# Patient Record
Sex: FEMALE | Race: WHITE | URBAN - METROPOLITAN AREA
[De-identification: names, ages, dates, MRNs, and addresses within clinical notes are randomized per-mention and may not be internally consistent; named-entity substitution may affect disease eponyms.]

---

## 2017-01-31 RX ORDER — GLIMEPIRIDE 2 MG/1
2 TABLET ORAL 2 TIMES DAILY
COMMUNITY

## 2017-02-02 NOTE — PHARMACY-ADMISSION MEDICATION HISTORY
Medication reconciliation completed by pre-admitting(Serina Ness).    Prior to Admission medications    Medication Sig Last Dose Taking? Auth Provider   TIZANIDINE HCL PO Take 4 mg by mouth every morning  Yes Unknown, Entered By History   TIZANIDINE HCL PO Take 8 mg by mouth At Bedtime  Yes Unknown, Entered By History   OMEPRAZOLE PO Take 20 mg by mouth 2 times daily (before meals)  Yes Unknown, Entered By History   FLUoxetine HCl (PROZAC PO) Take 20 mg by mouth daily  Yes Reported, Patient   glimepiride (AMARYL) 2 MG tablet Take 2 mg by mouth 2 times daily  Yes Reported, Patient   METFORMIN HCL PO Take 1,000 mg by mouth 2 times daily (with meals)  Yes Reported, Patient   GABAPENTIN PO Take 1,200 mg by mouth 2 times daily  Yes Reported, Patient   OXAZEPAM PO Take 10 mg by mouth 2 times daily  Yes Reported, Patient   HYDROcodone-acetaminophen (NORCO) 5-325 MG per tablet Take 1-2 tablets by mouth every 6 hours as needed for moderate to severe pain  Yes Roxie Qureshi MD   cyclobenzaprine (FLEXERIL) 10 MG tablet Take 1 tablet (10 mg) by mouth nightly as needed for muscle spasms  Yes Roxie Qureshi MD   HYDROXYZINE HCL PO Take 25 mg by mouth 3 times daily as needed for other (anxiety)  Yes Reported, Patient   LORAZEPAM PO Take 0.5 mg by mouth nightly as needed for anxiety   Yes Reported, Patient   NORTRIPTYLINE HCL PO Take 50 mg by mouth At Bedtime   Yes Reported, Patient   SIMVASTATIN PO Take 20 mg by mouth daily  Yes Reported, Patient   albuterol (PROAIR HFA, PROVENTIL HFA, VENTOLIN HFA) 108 (90 BASE) MCG/ACT inhaler Inhale 2 puffs into the lungs every 6 hours as needed   Yes Reported, Patient   METOPROLOL SUCCINATE ER PO Take 25 mg by mouth daily   Reported, Patient

## 2017-02-07 NOTE — PLAN OF CARE
Called Dr. Qureshi office and left voice message with staff regarding pt's blood glucose of 312 and hgb of 11.7.  Asked them to call me back.  2/8/17  Dr. Barrett staff called back and will let Dr. Qureshi know these 2 lab values.

## 2017-02-09 ENCOUNTER — ANESTHESIA EVENT (OUTPATIENT)
Dept: SURGERY | Facility: CLINIC | Age: 52
DRG: 472 | End: 2017-02-09
Payer: COMMERCIAL

## 2017-02-09 ENCOUNTER — ANESTHESIA (OUTPATIENT)
Dept: SURGERY | Facility: CLINIC | Age: 52
DRG: 472 | End: 2017-02-09
Payer: COMMERCIAL

## 2017-02-09 ENCOUNTER — HOSPITAL ENCOUNTER (INPATIENT)
Facility: CLINIC | Age: 52
LOS: 2 days | Discharge: HOME | DRG: 472 | End: 2017-02-11
Attending: NEUROLOGICAL SURGERY | Admitting: NEUROLOGICAL SURGERY
Payer: COMMERCIAL

## 2017-02-09 ENCOUNTER — APPOINTMENT (OUTPATIENT)
Dept: GENERAL RADIOLOGY | Facility: CLINIC | Age: 52
DRG: 472 | End: 2017-02-09
Attending: NEUROLOGICAL SURGERY
Payer: COMMERCIAL

## 2017-02-09 DIAGNOSIS — M43.06 LUMBAR SPONDYLOLYSIS: Primary | ICD-10-CM

## 2017-02-09 LAB
GLUCOSE BLDC GLUCOMTR-MCNC: 141 MG/DL (ref 70–99)
GLUCOSE BLDC GLUCOMTR-MCNC: 172 MG/DL (ref 70–99)

## 2017-02-09 PROCEDURE — 37000008 ZZH ANESTHESIA TECHNICAL FEE, 1ST 30 MIN: Performed by: NEUROLOGICAL SURGERY

## 2017-02-09 PROCEDURE — 25000128 H RX IP 250 OP 636: Performed by: ANESTHESIOLOGY

## 2017-02-09 PROCEDURE — 25800025 ZZH RX 258: Performed by: NURSE ANESTHETIST, CERTIFIED REGISTERED

## 2017-02-09 PROCEDURE — 25000132 ZZH RX MED GY IP 250 OP 250 PS 637: Performed by: NEUROLOGICAL SURGERY

## 2017-02-09 PROCEDURE — 00000146 ZZHCL STATISTIC GLUCOSE BY METER IP

## 2017-02-09 PROCEDURE — 0RG20J1 FUSION OF 2 OR MORE CERVICAL VERTEBRAL JOINTS WITH SYNTHETIC SUBSTITUTE, POSTERIOR APPROACH, POSTERIOR COLUMN, OPEN APPROACH: ICD-10-PCS | Performed by: NEUROLOGICAL SURGERY

## 2017-02-09 PROCEDURE — 95861 NEEDLE EMG 2 EXTREMITIES: CPT | Performed by: NEUROLOGICAL SURGERY

## 2017-02-09 PROCEDURE — 36000071 ZZH SURGERY LEVEL 5 W FLUORO 1ST 30 MIN: Performed by: NEUROLOGICAL SURGERY

## 2017-02-09 PROCEDURE — 95940 IONM IN OPERATNG ROOM 15 MIN: CPT | Performed by: NEUROLOGICAL SURGERY

## 2017-02-09 PROCEDURE — 25800025 ZZH RX 258: Performed by: ANESTHESIOLOGY

## 2017-02-09 PROCEDURE — 71000015 ZZH RECOVERY PHASE 1 LEVEL 2 EA ADDTL HR: Performed by: NEUROLOGICAL SURGERY

## 2017-02-09 PROCEDURE — 25000125 ZZHC RX 250: Performed by: ANESTHESIOLOGY

## 2017-02-09 PROCEDURE — 27211024 ZZHC OR SUPPLY OTHER OPNP: Performed by: NEUROLOGICAL SURGERY

## 2017-02-09 PROCEDURE — 71000014 ZZH RECOVERY PHASE 1 LEVEL 2 FIRST HR: Performed by: NEUROLOGICAL SURGERY

## 2017-02-09 PROCEDURE — 25000128 H RX IP 250 OP 636: Performed by: NEUROLOGICAL SURGERY

## 2017-02-09 PROCEDURE — 37000009 ZZH ANESTHESIA TECHNICAL FEE, EACH ADDTL 15 MIN: Performed by: NEUROLOGICAL SURGERY

## 2017-02-09 PROCEDURE — 27810322 ZZHC OR SPINE - CAGE/SPACER/DISK/CORD/CONNECTOR OPNP: Performed by: NEUROLOGICAL SURGERY

## 2017-02-09 PROCEDURE — 12000007 ZZH R&B INTERMEDIATE

## 2017-02-09 PROCEDURE — 95938 SOMATOSENSORY TESTING: CPT | Performed by: NEUROLOGICAL SURGERY

## 2017-02-09 PROCEDURE — 25000566 ZZH SEVOFLURANE, EA 15 MIN: Performed by: NEUROLOGICAL SURGERY

## 2017-02-09 PROCEDURE — 95939 C MOTOR EVOKED UPR&LWR LIMBS: CPT | Performed by: NEUROLOGICAL SURGERY

## 2017-02-09 PROCEDURE — 40000277 XR SURGERY CARM FLUORO LESS THAN 5 MIN W STILLS: Mod: TC

## 2017-02-09 PROCEDURE — 25000128 H RX IP 250 OP 636: Performed by: NURSE ANESTHETIST, CERTIFIED REGISTERED

## 2017-02-09 PROCEDURE — 25000125 ZZHC RX 250: Performed by: NEUROLOGICAL SURGERY

## 2017-02-09 PROCEDURE — 36000069 ZZH SURGERY LEVEL 5 EA 15 ADDTL MIN: Performed by: NEUROLOGICAL SURGERY

## 2017-02-09 PROCEDURE — 25000125 ZZHC RX 250: Performed by: NURSE ANESTHETIST, CERTIFIED REGISTERED

## 2017-02-09 PROCEDURE — 27210794 ZZH OR GENERAL SUPPLY STERILE: Performed by: NEUROLOGICAL SURGERY

## 2017-02-09 PROCEDURE — 40000306 ZZH STATISTIC PRE PROC ASSESS II: Performed by: NEUROLOGICAL SURGERY

## 2017-02-09 PROCEDURE — C1713 ANCHOR/SCREW BN/BN,TIS/BN: HCPCS | Performed by: NEUROLOGICAL SURGERY

## 2017-02-09 RX ORDER — ACETAMINOPHEN 500 MG
1000 TABLET ORAL ONCE
Status: DISCONTINUED | OUTPATIENT
Start: 2017-02-09 | End: 2017-02-10 | Stop reason: CLARIF

## 2017-02-09 RX ORDER — BACITRACIN 500 [USP'U]/G
OINTMENT OPHTHALMIC PRN
Status: DISCONTINUED | OUTPATIENT
Start: 2017-02-09 | End: 2017-02-09 | Stop reason: HOSPADM

## 2017-02-09 RX ORDER — FENTANYL CITRATE 50 UG/ML
INJECTION, SOLUTION INTRAMUSCULAR; INTRAVENOUS PRN
Status: DISCONTINUED | OUTPATIENT
Start: 2017-02-09 | End: 2017-02-09

## 2017-02-09 RX ORDER — NICOTINE POLACRILEX 4 MG
15-30 LOZENGE BUCCAL
Status: DISCONTINUED | OUTPATIENT
Start: 2017-02-09 | End: 2017-02-11 | Stop reason: HOSPADM

## 2017-02-09 RX ORDER — SODIUM CHLORIDE, SODIUM LACTATE, POTASSIUM CHLORIDE, CALCIUM CHLORIDE 600; 310; 30; 20 MG/100ML; MG/100ML; MG/100ML; MG/100ML
INJECTION, SOLUTION INTRAVENOUS CONTINUOUS PRN
Status: DISCONTINUED | OUTPATIENT
Start: 2017-02-09 | End: 2017-02-09

## 2017-02-09 RX ORDER — ACETAMINOPHEN 325 MG/1
650 TABLET ORAL EVERY 4 HOURS PRN
Status: DISCONTINUED | OUTPATIENT
Start: 2017-02-12 | End: 2017-02-11 | Stop reason: HOSPADM

## 2017-02-09 RX ORDER — HYDRALAZINE HYDROCHLORIDE 20 MG/ML
2.5-5 INJECTION INTRAMUSCULAR; INTRAVENOUS EVERY 10 MIN PRN
Status: DISCONTINUED | OUTPATIENT
Start: 2017-02-09 | End: 2017-02-09 | Stop reason: HOSPADM

## 2017-02-09 RX ORDER — ZOLPIDEM TARTRATE 5 MG/1
5 TABLET ORAL
Status: DISCONTINUED | OUTPATIENT
Start: 2017-02-10 | End: 2017-02-11 | Stop reason: HOSPADM

## 2017-02-09 RX ORDER — FENTANYL CITRATE 50 UG/ML
25-50 INJECTION, SOLUTION INTRAMUSCULAR; INTRAVENOUS
Status: DISCONTINUED | OUTPATIENT
Start: 2017-02-09 | End: 2017-02-09 | Stop reason: HOSPADM

## 2017-02-09 RX ORDER — GLYCOPYRROLATE 0.2 MG/ML
INJECTION, SOLUTION INTRAMUSCULAR; INTRAVENOUS PRN
Status: DISCONTINUED | OUTPATIENT
Start: 2017-02-09 | End: 2017-02-09

## 2017-02-09 RX ORDER — LABETALOL HYDROCHLORIDE 5 MG/ML
10 INJECTION, SOLUTION INTRAVENOUS EVERY 5 MIN PRN
Status: COMPLETED | OUTPATIENT
Start: 2017-02-09 | End: 2017-02-09

## 2017-02-09 RX ORDER — DIMENHYDRINATE 50 MG/ML
25 INJECTION, SOLUTION INTRAMUSCULAR; INTRAVENOUS
Status: DISCONTINUED | OUTPATIENT
Start: 2017-02-09 | End: 2017-02-09 | Stop reason: HOSPADM

## 2017-02-09 RX ORDER — ONDANSETRON 2 MG/ML
INJECTION INTRAMUSCULAR; INTRAVENOUS PRN
Status: DISCONTINUED | OUTPATIENT
Start: 2017-02-09 | End: 2017-02-09

## 2017-02-09 RX ORDER — LIDOCAINE 40 MG/G
CREAM TOPICAL
Status: DISCONTINUED | OUTPATIENT
Start: 2017-02-09 | End: 2017-02-09 | Stop reason: HOSPADM

## 2017-02-09 RX ORDER — SODIUM CHLORIDE, SODIUM LACTATE, POTASSIUM CHLORIDE, CALCIUM CHLORIDE 600; 310; 30; 20 MG/100ML; MG/100ML; MG/100ML; MG/100ML
INJECTION, SOLUTION INTRAVENOUS CONTINUOUS
Status: DISCONTINUED | OUTPATIENT
Start: 2017-02-09 | End: 2017-02-09 | Stop reason: HOSPADM

## 2017-02-09 RX ORDER — CEFAZOLIN SODIUM 2 G/100ML
2 INJECTION, SOLUTION INTRAVENOUS
Status: COMPLETED | OUTPATIENT
Start: 2017-02-09 | End: 2017-02-09

## 2017-02-09 RX ORDER — BUPIVACAINE HYDROCHLORIDE 7.5 MG/ML
INJECTION, SOLUTION EPIDURAL; RETROBULBAR PRN
Status: DISCONTINUED | OUTPATIENT
Start: 2017-02-09 | End: 2017-02-09 | Stop reason: HOSPADM

## 2017-02-09 RX ORDER — CEFAZOLIN SODIUM 1 G/3ML
1 INJECTION, POWDER, FOR SOLUTION INTRAMUSCULAR; INTRAVENOUS SEE ADMIN INSTRUCTIONS
Status: DISCONTINUED | OUTPATIENT
Start: 2017-02-09 | End: 2017-02-09 | Stop reason: HOSPADM

## 2017-02-09 RX ORDER — PROPOFOL 10 MG/ML
INJECTION, EMULSION INTRAVENOUS CONTINUOUS PRN
Status: DISCONTINUED | OUTPATIENT
Start: 2017-02-09 | End: 2017-02-09

## 2017-02-09 RX ORDER — LABETALOL HYDROCHLORIDE 5 MG/ML
10 INJECTION, SOLUTION INTRAVENOUS
Status: DISCONTINUED | OUTPATIENT
Start: 2017-02-09 | End: 2017-02-09 | Stop reason: HOSPADM

## 2017-02-09 RX ORDER — ACETAMINOPHEN 325 MG/1
975 TABLET ORAL EVERY 8 HOURS
Status: DISCONTINUED | OUTPATIENT
Start: 2017-02-09 | End: 2017-02-11 | Stop reason: HOSPADM

## 2017-02-09 RX ORDER — ONDANSETRON 2 MG/ML
4 INJECTION INTRAMUSCULAR; INTRAVENOUS EVERY 30 MIN PRN
Status: DISCONTINUED | OUTPATIENT
Start: 2017-02-09 | End: 2017-02-09 | Stop reason: HOSPADM

## 2017-02-09 RX ORDER — SODIUM CHLORIDE AND POTASSIUM CHLORIDE 150; 900 MG/100ML; MG/100ML
INJECTION, SOLUTION INTRAVENOUS CONTINUOUS
Status: DISCONTINUED | OUTPATIENT
Start: 2017-02-09 | End: 2017-02-10 | Stop reason: CLARIF

## 2017-02-09 RX ORDER — HYDROMORPHONE HYDROCHLORIDE 1 MG/ML
.3-.5 INJECTION, SOLUTION INTRAMUSCULAR; INTRAVENOUS; SUBCUTANEOUS EVERY 5 MIN PRN
Status: DISCONTINUED | OUTPATIENT
Start: 2017-02-09 | End: 2017-02-09 | Stop reason: HOSPADM

## 2017-02-09 RX ORDER — NEOSTIGMINE METHYLSULFATE 1 MG/ML
VIAL (ML) INJECTION PRN
Status: DISCONTINUED | OUTPATIENT
Start: 2017-02-09 | End: 2017-02-09

## 2017-02-09 RX ORDER — ONDANSETRON 4 MG/1
4 TABLET, ORALLY DISINTEGRATING ORAL EVERY 30 MIN PRN
Status: DISCONTINUED | OUTPATIENT
Start: 2017-02-09 | End: 2017-02-09 | Stop reason: HOSPADM

## 2017-02-09 RX ORDER — NALOXONE HYDROCHLORIDE 0.4 MG/ML
.1-.4 INJECTION, SOLUTION INTRAMUSCULAR; INTRAVENOUS; SUBCUTANEOUS
Status: DISCONTINUED | OUTPATIENT
Start: 2017-02-09 | End: 2017-02-11 | Stop reason: HOSPADM

## 2017-02-09 RX ORDER — DEXTROSE MONOHYDRATE 25 G/50ML
25-50 INJECTION, SOLUTION INTRAVENOUS
Status: DISCONTINUED | OUTPATIENT
Start: 2017-02-09 | End: 2017-02-11 | Stop reason: HOSPADM

## 2017-02-09 RX ORDER — VANCOMYCIN HYDROCHLORIDE 1 G/200ML
1000 INJECTION, SOLUTION INTRAVENOUS ONCE
Status: COMPLETED | OUTPATIENT
Start: 2017-02-09 | End: 2017-02-09

## 2017-02-09 RX ORDER — CYCLOBENZAPRINE HCL 10 MG
10 TABLET ORAL 3 TIMES DAILY PRN
Status: DISCONTINUED | OUTPATIENT
Start: 2017-02-09 | End: 2017-02-10

## 2017-02-09 RX ORDER — LIDOCAINE 40 MG/G
5 CREAM TOPICAL
Status: DISCONTINUED | OUTPATIENT
Start: 2017-02-09 | End: 2017-02-11 | Stop reason: HOSPADM

## 2017-02-09 RX ORDER — PROPOFOL 10 MG/ML
INJECTION, EMULSION INTRAVENOUS PRN
Status: DISCONTINUED | OUTPATIENT
Start: 2017-02-09 | End: 2017-02-09

## 2017-02-09 RX ORDER — CEFAZOLIN SODIUM 2 G/100ML
2 INJECTION, SOLUTION INTRAVENOUS EVERY 8 HOURS
Status: COMPLETED | OUTPATIENT
Start: 2017-02-10 | End: 2017-02-10

## 2017-02-09 RX ORDER — OXYCODONE HYDROCHLORIDE 5 MG/1
5-10 TABLET ORAL
Status: DISCONTINUED | OUTPATIENT
Start: 2017-02-09 | End: 2017-02-11 | Stop reason: HOSPADM

## 2017-02-09 RX ORDER — DEXAMETHASONE SODIUM PHOSPHATE 4 MG/ML
INJECTION, SOLUTION INTRA-ARTICULAR; INTRALESIONAL; INTRAMUSCULAR; INTRAVENOUS; SOFT TISSUE PRN
Status: DISCONTINUED | OUTPATIENT
Start: 2017-02-09 | End: 2017-02-09

## 2017-02-09 RX ADMIN — FENTANYL CITRATE 100 MCG: 50 INJECTION, SOLUTION INTRAMUSCULAR; INTRAVENOUS at 16:10

## 2017-02-09 RX ADMIN — SODIUM CHLORIDE, POTASSIUM CHLORIDE, SODIUM LACTATE AND CALCIUM CHLORIDE: 600; 310; 30; 20 INJECTION, SOLUTION INTRAVENOUS at 16:04

## 2017-02-09 RX ADMIN — HYDROMORPHONE HYDROCHLORIDE 0.4 MG: 1 INJECTION, SOLUTION INTRAMUSCULAR; INTRAVENOUS; SUBCUTANEOUS at 18:44

## 2017-02-09 RX ADMIN — MIDAZOLAM HYDROCHLORIDE 2 MG: 1 INJECTION, SOLUTION INTRAMUSCULAR; INTRAVENOUS at 16:04

## 2017-02-09 RX ADMIN — ROCURONIUM BROMIDE 35 MG: 10 INJECTION INTRAVENOUS at 16:10

## 2017-02-09 RX ADMIN — FENTANYL CITRATE 50 MCG: 50 INJECTION, SOLUTION INTRAMUSCULAR; INTRAVENOUS at 16:46

## 2017-02-09 RX ADMIN — Medication 50 MG: at 16:10

## 2017-02-09 RX ADMIN — VANCOMYCIN HYDROCHLORIDE 1000 MG: 1 INJECTION, SOLUTION INTRAVENOUS at 14:40

## 2017-02-09 RX ADMIN — FENTANYL CITRATE 50 MCG: 50 INJECTION INTRAMUSCULAR; INTRAVENOUS at 19:12

## 2017-02-09 RX ADMIN — FENTANYL CITRATE 50 MCG: 50 INJECTION INTRAMUSCULAR; INTRAVENOUS at 18:30

## 2017-02-09 RX ADMIN — HYDROMORPHONE HYDROCHLORIDE 0.5 MG: 1 INJECTION, SOLUTION INTRAMUSCULAR; INTRAVENOUS; SUBCUTANEOUS at 19:45

## 2017-02-09 RX ADMIN — HYDROMORPHONE HYDROCHLORIDE 0.3 MG: 1 INJECTION, SOLUTION INTRAMUSCULAR; INTRAVENOUS; SUBCUTANEOUS at 18:32

## 2017-02-09 RX ADMIN — ROCURONIUM BROMIDE 15 MG: 10 INJECTION INTRAVENOUS at 16:52

## 2017-02-09 RX ADMIN — PHENYLEPHRINE HYDROCHLORIDE 100 MCG: 10 INJECTION, SOLUTION INTRAMUSCULAR; INTRAVENOUS; SUBCUTANEOUS at 17:05

## 2017-02-09 RX ADMIN — LABETALOL HYDROCHLORIDE 10 MG: 5 INJECTION, SOLUTION INTRAVENOUS at 19:38

## 2017-02-09 RX ADMIN — PHENYLEPHRINE HYDROCHLORIDE 100 MCG: 10 INJECTION, SOLUTION INTRAMUSCULAR; INTRAVENOUS; SUBCUTANEOUS at 17:00

## 2017-02-09 RX ADMIN — DEXAMETHASONE SODIUM PHOSPHATE 6 MG: 4 INJECTION, SOLUTION INTRAMUSCULAR; INTRAVENOUS at 16:10

## 2017-02-09 RX ADMIN — GLYCOPYRROLATE 0.2 MG: 0.2 INJECTION, SOLUTION INTRAMUSCULAR; INTRAVENOUS at 17:51

## 2017-02-09 RX ADMIN — PROPOFOL 75 MCG/KG/MIN: 10 INJECTION, EMULSION INTRAVENOUS at 16:45

## 2017-02-09 RX ADMIN — HYDROMORPHONE HYDROCHLORIDE 0.5 MG: 1 INJECTION, SOLUTION INTRAMUSCULAR; INTRAVENOUS; SUBCUTANEOUS at 17:45

## 2017-02-09 RX ADMIN — HYDROMORPHONE HYDROCHLORIDE 0.3 MG: 1 INJECTION, SOLUTION INTRAMUSCULAR; INTRAVENOUS; SUBCUTANEOUS at 19:12

## 2017-02-09 RX ADMIN — PHENYLEPHRINE HYDROCHLORIDE 100 MCG: 10 INJECTION, SOLUTION INTRAMUSCULAR; INTRAVENOUS; SUBCUTANEOUS at 17:25

## 2017-02-09 RX ADMIN — ONDANSETRON 4 MG: 2 INJECTION INTRAMUSCULAR; INTRAVENOUS at 16:10

## 2017-02-09 RX ADMIN — FENTANYL CITRATE 50 MCG: 50 INJECTION INTRAMUSCULAR; INTRAVENOUS at 18:43

## 2017-02-09 RX ADMIN — PROPOFOL 200 MG: 10 INJECTION, EMULSION INTRAVENOUS at 16:10

## 2017-02-09 RX ADMIN — CEFAZOLIN SODIUM 2 G: 2 INJECTION, SOLUTION INTRAVENOUS at 16:04

## 2017-02-09 RX ADMIN — HYDROMORPHONE HYDROCHLORIDE: 10 INJECTION, SOLUTION INTRAMUSCULAR; INTRAVENOUS; SUBCUTANEOUS at 19:46

## 2017-02-09 RX ADMIN — FENTANYL CITRATE 100 MCG: 50 INJECTION, SOLUTION INTRAMUSCULAR; INTRAVENOUS at 16:54

## 2017-02-09 RX ADMIN — GLYCOPYRROLATE 0.1 MG: 0.2 INJECTION, SOLUTION INTRAMUSCULAR; INTRAVENOUS at 16:10

## 2017-02-09 RX ADMIN — PHENYLEPHRINE HYDROCHLORIDE 100 MCG: 10 INJECTION, SOLUTION INTRAMUSCULAR; INTRAVENOUS; SUBCUTANEOUS at 16:52

## 2017-02-09 RX ADMIN — SODIUM CHLORIDE, POTASSIUM CHLORIDE, SODIUM LACTATE AND CALCIUM CHLORIDE: 600; 310; 30; 20 INJECTION, SOLUTION INTRAVENOUS at 16:19

## 2017-02-09 RX ADMIN — SODIUM CHLORIDE, POTASSIUM CHLORIDE, SODIUM LACTATE AND CALCIUM CHLORIDE: 600; 310; 30; 20 INJECTION, SOLUTION INTRAVENOUS at 16:53

## 2017-02-09 RX ADMIN — Medication 2 MG: at 17:51

## 2017-02-09 RX ADMIN — POTASSIUM CHLORIDE AND SODIUM CHLORIDE: 900; 150 INJECTION, SOLUTION INTRAVENOUS at 21:48

## 2017-02-09 RX ADMIN — FENTANYL CITRATE 50 MCG: 50 INJECTION INTRAMUSCULAR; INTRAVENOUS at 19:48

## 2017-02-09 RX ADMIN — ACETAMINOPHEN 975 MG: 325 TABLET, FILM COATED ORAL at 19:40

## 2017-02-09 RX ADMIN — PHENYLEPHRINE HYDROCHLORIDE 100 MCG: 10 INJECTION, SOLUTION INTRAMUSCULAR; INTRAVENOUS; SUBCUTANEOUS at 16:40

## 2017-02-09 ASSESSMENT — ENCOUNTER SYMPTOMS
STRIDOR: 0
SEIZURES: 0
DYSRHYTHMIAS: 0

## 2017-02-09 ASSESSMENT — LIFESTYLE VARIABLES: TOBACCO_USE: 1

## 2017-02-09 ASSESSMENT — COPD QUESTIONNAIRES
COPD: 1
CAT_SEVERITY: MILD

## 2017-02-09 NOTE — ANESTHESIA PREPROCEDURE EVALUATION
Anesthesia Evaluation     . Pt has had prior anesthetic.     History of anesthetic complications  - PONV    ROS/MED HX    ENT/Pulmonary:     (+)EDSON risk factors hypertension, obese, tobacco use, Past use Intermittent asthma mild COPD, , . .   (-) recent URI   Neurologic:  - neg neurologic ROS    (-) seizures and CVA   Cardiovascular:     (+) Dyslipidemia, hypertension----. : . . . :. . Previous cardiac testing date:results:date: results:ECG reviewed date:1/17 results:NSR date: results:         (-) CAD, arrhythmias and valvular problems/murmurs   METS/Exercise Tolerance:     Hematologic: Comments: Hgb 11.7  K 4.3  Cr 1.18 - neg hematologic  ROS       Musculoskeletal:   (+) arthritis, , , -       GI/Hepatic:     (+) GERD Asymptomatic on medication,      (-) hepatitis and liver disease   Renal/Genitourinary:  - ROS Renal section negative       Endo:     (+) type II DM Not using insulin - not using insulin pump Obesity, .   (-) Type I DM, thyroid disease and chronic steroid usage   Psychiatric:     (+) psychiatric history anxiety and depression      Infectious Disease:  - neg infectious disease ROS       Malignancy:      - no malignancy   Other:    (+) H/O Chronic Pain,             Physical Exam  Normal systems: cardiovascular, pulmonary and dental    Airway   Mallampati: I  TM distance: >3 FB  Neck ROM: limited    Dental     Cardiovascular   Rhythm and rate: regular and normal  (-) no friction rub, no systolic click and no murmur    Pulmonary    breath sounds clear to auscultation(-) no rhonchi, no decreased breath sounds, no wheezes, no rales and no stridor                    Anesthesia Plan      History & Physical Review  History and physical reviewed and following examination; no interval change.    ASA Status:  3 .    NPO Status:  > 8 hours    Plan for General and ETT with Intravenous induction. Maintenance will be Balanced.    PONV prophylaxis:  Ondansetron (or other 5HT-3) and Dexamethasone or  Solumedrol  Additional equipment: Videolaryngoscope      Postoperative Care  Postoperative pain management:  IV analgesics.      Consents  Anesthetic plan, risks, benefits and alternatives discussed with:  Patient or representative and Patient..                          .

## 2017-02-09 NOTE — IP AVS SNAPSHOT
[facility]    [address] [facility] [address]    Phone:  [phone]    Fax:  [phone]                                       After Visit Summary   2/9/2017    Jessica Rucker    MRN: [id number]           After Visit Summary Signature Page     I have received my discharge instructions, and my questions have been answered. I have discussed any challenges I see with this plan with the nurse or doctor.    ..........................................................................................................................................  Patient/Patient Representative Signature      ..........................................................................................................................................  Patient Representative Print Name and Relationship to Patient    ..................................................               ................................................  Date                                            Time    ..........................................................................................................................................  Reviewed by Signature/Title    ...................................................              ..............................................  Date                                                            Time

## 2017-02-09 NOTE — IP AVS SNAPSHOT
MRN:[id number]                      After Visit Summary   2/9/2017    Jessica Rucker    MRN: [id number]           Thank you!     Thank you for choosing [facility] for your care. Our goal is always to provide you with excellent care. Hearing back from our patients is one way we can continue to improve our services. Please take a few minutes to complete the written survey that you may receive in the mail after you visit. If you would like to speak to someone directly about your visit please contact Patient Relations at [phone]. Thank you!          Patient Information     Date Of Birth          1965        About your hospital stay     You were admitted on:  February 9, 2017 You last received care in the:  [facility]    You were discharged on:  February 11, 2017        Reason for your hospital stay       Cervical spine surgery                  Who to Call     For medical emergencies, please call 911.  For non-urgent questions about your medical care, please call your primary care provider or clinic, [phone]  For questions related to your surgery, please call your surgery clinic        Attending Provider     Provider    Roxie Qureshi MD       Primary Care Provider Office Phone # Fax #    Luca Schuster [phone]       [place] [address]        After Care Instructions     Activity       Your activity upon discharge: Your activity upon discharge: Ad junior within following limitations:  No excessive activities   No Bending, Twisting, climbing, Crawling,   No lifting more than 8 lb for 2 weeks, or 15 lb for 2 months or 25 lb for 4 months or 35 lb for 6 months  Brace for riding cars for 4-6 months            Diet       Follow this diet upon discharge: DM diet                  Follow-up Appointments     Follow-up and recommended labs and tests        Follow up in 2 weeks with me or PCP for wound  check ( patient's choice) if patients want to go to PCP for wound check, then f/u in my office  In 3 months                  Further instructions from your care team                         Managing Post-Op Pain at Home: Medicines  Pain after an operation (post-op pain) is common and expected. These guidelines can help you stay as comfortable as possible.    Taking pain medicines    Take medicines on time. Do not take more than prescribed.    Take only the medicines that your healthcare provider tells you to take.    Take pain medicines with some food to avoid an upset stomach.    Don t drink alcohol while using pain medicines.  Types of pain medicines  Non-opioid:    Over-the-counter (such as acetaminophen and ibuprofen) or prescription    All relieve mild to moderate pain and some reduce swelling    Possible side effects include stomach upset and bleeding, high doses may cause kidney or liver problems    Check with your doctor before taking over-the-counter pain medicines in addition to your prescribed pain medicine  Opioid:    Always a prescription    Relieve moderate to severe pain    Possible side effects include stomach upset, nausea, and itching    May cause constipation (to help prevent this, eat high-fiber foods and drink plenty of water)    Your doctor may recommend a stool softener  When to seek medical care  Call your doctor or seek immediate attention if you notice any of these symptoms:    Nausea, vomiting, diarrhea, lasting constipation, or stomach cramps    Breathing problems or a fast heart rate    Feeling very tired, sluggish, or dizzy    Skin rash     7027-9122 [employer]. [address] [place] [address]17. All rights reserved. This information is not intended as a substitute for professional medical care. Always follow your healthcare professional's instructions.        Medicine for Pain  Medicines can help to block pain, decrease inflammation, and treat related problems.  More than 1 medicine may be used to treat your pain. Medicines may be changed as you feel better, or if they cause side effects.  Medicines What they do Possible side effects   Non-opioid NSAIDs, aspirin, acetaminophen Reduce pain chemicals at the site of pain. NSAIDs can reduce joint and soft tissue inflammation. Nausea, stomach pain, ulcers, indigestion, bleeding, kidney, or liver problems. Certain NSAIDs may increase cardiovascular risk in some patients. Talk with your health care provider.   Opioids (morphine and similar medicines often called narcotics) Reduce feelings or perception of pain. Used for moderate to severe pain. Nausea, vomiting, itching, drowsiness, constipation, slowed breathing.   Other medicines (corticosteroids, antinausea, antidepressant, and antiseizure medicines) Reduce swelling, burning or tingling pain or limit certain side effects of pain medicines, like nausea or vomiting. Your health care provider will explain the possible side effects of these medicines.   Anesthetics (local, injected) include lidocaine, benzocaine, and medicines used by anesthesiologists Stop pain signals from reaching the brain by blocking feeling in the treated area. Nausea, low blood pressure, fever, slowed breathing, fainting, seizures, heart attack.   When to call the health care provider  Call your health care provider right away (or have a family member call) if you have:    Unrelieved pain    Side effects, including constipation or uncontrolled nausea, that interfere with daily activities  If you have extreme sleepiness or breathing problems, call 911.     1229-6922 [employer]. [address] [place] [address], [place] [address]. All rights reserved. This information is not intended as a substitute for professional medical care. Always follow your healthcare professional's instructions.        Call your physician if you experience:  1. Fever greater than 100 degrees with body chills or excessive  "sweating.  2. Increased redness, localized warmth, tenderness, drainage or swelling at incision site.  Opening or pulling apart of incision site.   3. Pain not controlled with oral pain medications, ice and rest.   4. No bowel movement in 3 days (may use Milk of Magnesia or other over the counter remedy).  5. Chest pain, shortness of breath, and/or calf pain with excessive swelling.  6. Generalized feeling of illness.  7. Any other questions or concerns related to your surgery/recovery.      Thank you for allowing [facility] to participate in your cares!!    Pending Results     No orders found from 2017 to 2/10/2017.            Statement of Approval     Ordered          17 1115  I have reviewed and agree with all the recommendations and orders detailed in this document.   EFFECTIVE NOW     Approved and electronically signed by:  Roxie Qureshi MD             Admission Information        Provider Department Dept Phone    2017 Roxie Qureshi MD  Orth[facility] Spine [phone]      Your Vitals Were     Blood Pressure Pulse Temperature    97/58 mmHg 103 99.9  F (37.7  C) (Oral)    Respirations Height Weight    18 1.664 m (5' 5.5\") 98.975 kg (218 lb 3.2 oz)    BMI (Body Mass Index) Pulse Oximetry       35.75 kg/m2 95%       MyChart Information     [employer] lets you send messages to your doctor, view your test results, renew your prescriptions, schedule appointments and more. To sign up, go to [email or web address] . Click on \"Log in\" on the left side of the screen, which will take you to the Welcome page. Then click on \"Sign up Now\" on the right side of the page.     You will be asked to enter the access code listed below, as well as some personal information. Please follow the directions to create your username and password.     Your access code is: [email or web address]  Expires: 2017  8:43 AM     Your access code will  in 90 days. If you need help or a new code, please call your [place] " clinic or [phone].        Care EveryWhere ID     This is your Care EveryWhere ID. This could be used by other organizations to access your [facility] medical records  [id number]           Review of your medicines      CONTINUE these medicines which have NOT CHANGED        Dose / Directions    albuterol 108 (90 BASE) MCG/ACT Inhaler   Commonly known as:  PROAIR HFA/PROVENTIL HFA/VENTOLIN HFA   Notes to Patient:  Resume per home schedule        Dose:  2 puff   Inhale 2 puffs into the lungs every 6 hours as needed   Refills:  0       GABAPENTIN PO   Notes to Patient:  Resume per home schedule        Dose:  1200 mg   Take 1,200 mg by mouth 2 times daily   Refills:  0       glimepiride 2 MG tablet   Commonly known as:  AMARYL   Notes to Patient:  Resume per home schedule        Dose:  2 mg   Take 2 mg by mouth 2 times daily   Refills:  0       HYDROcodone-acetaminophen 5-325 MG per tablet   Commonly known as:  NORCO   Used for:  Lumbar spondylolysis   Notes to Patient:  Next dose available to be taken after 3 pm        Dose:  1-2 tablet   Take 1-2 tablets by mouth every 6 hours as needed for moderate to severe pain   Quantity:  60 tablet   Refills:  0       HYDROXYZINE HCL PO   Notes to Patient:  Resume per home schedule        Dose:  25 mg   Take 25 mg by mouth 3 times daily as needed for other (anxiety)   Refills:  0       liraglutide 18 MG/3ML soln   Commonly known as:  VICTOZA   Notes to Patient:  Resume per home schedule        Inject Subcutaneous daily   Refills:  0       LORAZEPAM PO        Dose:  0.5 mg   Take 0.5 mg by mouth nightly as needed for anxiety   Refills:  0       METFORMIN HCL PO   Notes to Patient:  Resume per home schedule        Dose:  1000 mg   Take 1,000 mg by mouth 2 times daily (with meals)   Refills:  0       METOPROLOL SUCCINATE ER PO   Notes to Patient:  Resume per home schedule        Dose:  25 mg   Take 25 mg by mouth daily   Refills:  0       NORTRIPTYLINE HCL PO   Notes to Patient:   Resume per home schedule        Dose:  50 mg   Take 50 mg by mouth At Bedtime   Refills:  0       OMEPRAZOLE PO   Notes to Patient:  Resume per home schedule        Dose:  20 mg   Take 20 mg by mouth 2 times daily (before meals)   Refills:  0       OXAZEPAM PO   Notes to Patient:  Resume per home schedule        Dose:  10 mg   Take 10 mg by mouth 2 times daily   Refills:  0       PROZAC PO   Notes to Patient:  Resume per home schedule        Dose:  20 mg   Take 20 mg by mouth daily   Refills:  0       SIMVASTATIN PO   Notes to Patient:  Resume per home schedule        Dose:  20 mg   Take 20 mg by mouth daily   Refills:  0       * TIZANIDINE HCL PO   Notes to Patient:  Resume per home schedule        Dose:  4 mg   Take 4 mg by mouth every morning   Refills:  0       * TIZANIDINE HCL PO   Notes to Patient:  Resume per home schedule        Dose:  8 mg   Take 8 mg by mouth At Bedtime   Refills:  0       * Notice:  This list has 2 medication(s) that are the same as other medications prescribed for you. Read the directions carefully, and ask your doctor or other care provider to review them with you.         Where to get your medicines      Some of these will need a paper prescription and others can be bought over the counter. Ask your nurse if you have questions.     Bring a paper prescription for each of these medications    - HYDROcodone-acetaminophen 5-325 MG per tablet             Protect others around you: Learn how to safely use, store and throw away your medicines at www.disposemymeds.org.             Medication List: This is a list of all your medications and when to take them. Check marks below indicate your daily home schedule. Keep this list as a reference.      Medications           Morning Afternoon Evening Bedtime As Needed    albuterol 108 (90 BASE) MCG/ACT Inhaler   Commonly known as:  PROAIR HFA/PROVENTIL HFA/VENTOLIN HFA   Inhale 2 puffs into the lungs every 6 hours as needed   Notes to Patient:  Resume  per home schedule                                GABAPENTIN PO   Take 1,200 mg by mouth 2 times daily   Last time this was given:  300 mg on 2/11/2017 11:57 AM   Notes to Patient:  Resume per home schedule                                glimepiride 2 MG tablet   Commonly known as:  AMARYL   Take 2 mg by mouth 2 times daily   Last time this was given:  2 mg on 2/11/2017  8:38 AM   Notes to Patient:  Resume per home schedule                                HYDROcodone-acetaminophen 5-325 MG per tablet   Commonly known as:  NORCO   Take 1-2 tablets by mouth every 6 hours as needed for moderate to severe pain   Notes to Patient:  Next dose available to be taken after 3 pm                            Next dose available to be taken after 3 pm       HYDROXYZINE HCL PO   Take 25 mg by mouth 3 times daily as needed for other (anxiety)   Last time this was given:  25 mg on 2/11/2017 11:58 AM   Notes to Patient:  Resume per home schedule                                liraglutide 18 MG/3ML soln   Commonly known as:  VICTOZA   Inject Subcutaneous daily   Last time this was given:  1.2 mg on 2/11/2017  8:38 AM   Notes to Patient:  Resume per home schedule                                LORAZEPAM PO   Take 0.5 mg by mouth nightly as needed for anxiety                                METFORMIN HCL PO   Take 1,000 mg by mouth 2 times daily (with meals)   Last time this was given:  1,000 mg on 2/11/2017  8:38 AM   Notes to Patient:  Resume per home schedule                                METOPROLOL SUCCINATE ER PO   Take 25 mg by mouth daily   Last time this was given:  25 mg on 2/10/2017 11:56 AM   Notes to Patient:  Resume per home schedule                                NORTRIPTYLINE HCL PO   Take 50 mg by mouth At Bedtime   Last time this was given:  50 mg on 2/10/2017 10:15 PM   Notes to Patient:  Resume per home schedule                                OMEPRAZOLE PO   Take 20 mg by mouth 2 times daily (before meals)   Last time  this was given:  20 mg on 2/11/2017  6:37 AM   Notes to Patient:  Resume per home schedule                                OXAZEPAM PO   Take 10 mg by mouth 2 times daily   Notes to Patient:  Resume per home schedule                                PROZAC PO   Take 20 mg by mouth daily   Last time this was given:  20 mg on 2/11/2017  8:38 AM   Notes to Patient:  Resume per home schedule                                SIMVASTATIN PO   Take 20 mg by mouth daily   Last time this was given:  20 mg on 2/10/2017 10:15 PM   Notes to Patient:  Resume per home schedule                                * TIZANIDINE HCL PO   Take 4 mg by mouth every morning   Last time this was given:  4 mg on 2/11/2017 10:49 AM   Notes to Patient:  Resume per home schedule                                * TIZANIDINE HCL PO   Take 8 mg by mouth At Bedtime   Last time this was given:  4 mg on 2/11/2017 10:49 AM   Notes to Patient:  Resume per home schedule                                * Notice:  This list has 2 medication(s) that are the same as other medications prescribed for you. Read the directions carefully, and ask your doctor or other care provider to review them with you.

## 2017-02-10 ENCOUNTER — APPOINTMENT (OUTPATIENT)
Dept: PHYSICAL THERAPY | Facility: CLINIC | Age: 52
DRG: 472 | End: 2017-02-10
Attending: NEUROLOGICAL SURGERY
Payer: COMMERCIAL

## 2017-02-10 LAB
ANION GAP SERPL CALCULATED.3IONS-SCNC: 9 MMOL/L (ref 3–14)
BUN SERPL-MCNC: 17 MG/DL (ref 7–30)
CALCIUM SERPL-MCNC: 8.8 MG/DL (ref 8.5–10.1)
CHLORIDE SERPL-SCNC: 99 MMOL/L (ref 94–109)
CO2 SERPL-SCNC: 26 MMOL/L (ref 20–32)
CREAT SERPL-MCNC: 0.86 MG/DL (ref 0.52–1.04)
ERYTHROCYTE [DISTWIDTH] IN BLOOD BY AUTOMATED COUNT: 14.3 % (ref 10–15)
GFR SERPL CREATININE-BSD FRML MDRD: 69 ML/MIN/1.7M2
GLUCOSE BLDC GLUCOMTR-MCNC: 188 MG/DL (ref 70–99)
GLUCOSE BLDC GLUCOMTR-MCNC: 201 MG/DL (ref 70–99)
GLUCOSE BLDC GLUCOMTR-MCNC: 240 MG/DL (ref 70–99)
GLUCOSE BLDC GLUCOMTR-MCNC: 250 MG/DL (ref 70–99)
GLUCOSE BLDC GLUCOMTR-MCNC: 332 MG/DL (ref 70–99)
GLUCOSE BLDC GLUCOMTR-MCNC: 408 MG/DL (ref 70–99)
GLUCOSE SERPL-MCNC: 208 MG/DL (ref 70–99)
HBA1C MFR BLD: 10.6 % (ref 4.3–6)
HCT VFR BLD AUTO: 36.6 % (ref 35–47)
HGB BLD-MCNC: 11.8 G/DL (ref 11.7–15.7)
MCH RBC QN AUTO: 25.9 PG (ref 26.5–33)
MCHC RBC AUTO-ENTMCNC: 32.2 G/DL (ref 31.5–36.5)
MCV RBC AUTO: 80 FL (ref 78–100)
PLATELET # BLD AUTO: 261 10E9/L (ref 150–450)
POTASSIUM SERPL-SCNC: 4.4 MMOL/L (ref 3.4–5.3)
POTASSIUM SERPL-SCNC: 5.4 MMOL/L (ref 3.4–5.3)
RBC # BLD AUTO: 4.56 10E12/L (ref 3.8–5.2)
SODIUM SERPL-SCNC: 134 MMOL/L (ref 133–144)
WBC # BLD AUTO: 11.4 10E9/L (ref 4–11)

## 2017-02-10 PROCEDURE — 25000132 ZZH RX MED GY IP 250 OP 250 PS 637: Performed by: NURSE PRACTITIONER

## 2017-02-10 PROCEDURE — 80048 BASIC METABOLIC PNL TOTAL CA: CPT | Performed by: NEUROLOGICAL SURGERY

## 2017-02-10 PROCEDURE — 25000125 ZZHC RX 250: Performed by: NEUROLOGICAL SURGERY

## 2017-02-10 PROCEDURE — 97116 GAIT TRAINING THERAPY: CPT | Mod: GP | Performed by: PHYSICAL THERAPIST

## 2017-02-10 PROCEDURE — 25000132 ZZH RX MED GY IP 250 OP 250 PS 637: Performed by: INTERNAL MEDICINE

## 2017-02-10 PROCEDURE — 85027 COMPLETE CBC AUTOMATED: CPT | Performed by: NEUROLOGICAL SURGERY

## 2017-02-10 PROCEDURE — 12000000 ZZH R&B MED SURG/OB

## 2017-02-10 PROCEDURE — 84132 ASSAY OF SERUM POTASSIUM: CPT | Performed by: PHYSICIAN ASSISTANT

## 2017-02-10 PROCEDURE — 40000193 ZZH STATISTIC PT WARD VISIT: Performed by: PHYSICAL THERAPIST

## 2017-02-10 PROCEDURE — 83036 HEMOGLOBIN GLYCOSYLATED A1C: CPT | Performed by: NEUROLOGICAL SURGERY

## 2017-02-10 PROCEDURE — 36415 COLL VENOUS BLD VENIPUNCTURE: CPT | Performed by: NEUROLOGICAL SURGERY

## 2017-02-10 PROCEDURE — 97530 THERAPEUTIC ACTIVITIES: CPT | Mod: GP | Performed by: PHYSICAL THERAPIST

## 2017-02-10 PROCEDURE — 25000132 ZZH RX MED GY IP 250 OP 250 PS 637: Performed by: NEUROLOGICAL SURGERY

## 2017-02-10 PROCEDURE — 25000131 ZZH RX MED GY IP 250 OP 636 PS 637: Performed by: INTERNAL MEDICINE

## 2017-02-10 PROCEDURE — 25000132 ZZH RX MED GY IP 250 OP 250 PS 637: Performed by: PHYSICIAN ASSISTANT

## 2017-02-10 PROCEDURE — 36415 COLL VENOUS BLD VENIPUNCTURE: CPT | Performed by: PHYSICIAN ASSISTANT

## 2017-02-10 PROCEDURE — 99223 1ST HOSP IP/OBS HIGH 75: CPT | Performed by: NURSE PRACTITIONER

## 2017-02-10 PROCEDURE — 97161 PT EVAL LOW COMPLEX 20 MIN: CPT | Mod: GP | Performed by: PHYSICAL THERAPIST

## 2017-02-10 PROCEDURE — 99222 1ST HOSP IP/OBS MODERATE 55: CPT | Performed by: INTERNAL MEDICINE

## 2017-02-10 PROCEDURE — 25000128 H RX IP 250 OP 636: Performed by: NEUROLOGICAL SURGERY

## 2017-02-10 PROCEDURE — 00000146 ZZHCL STATISTIC GLUCOSE BY METER IP

## 2017-02-10 PROCEDURE — 25000130 H RX MED GY IP 250 OP 259 PS 637: Performed by: INTERNAL MEDICINE

## 2017-02-10 RX ORDER — LIRAGLUTIDE 6 MG/ML
1.8 INJECTION SUBCUTANEOUS DAILY
Status: DISCONTINUED | OUTPATIENT
Start: 2017-02-10 | End: 2017-02-10

## 2017-02-10 RX ORDER — GABAPENTIN 300 MG/1
300 CAPSULE ORAL DAILY
Status: DISCONTINUED | OUTPATIENT
Start: 2017-02-11 | End: 2017-02-11 | Stop reason: HOSPADM

## 2017-02-10 RX ORDER — HYDROXYZINE HYDROCHLORIDE 50 MG/1
50 TABLET, FILM COATED ORAL AT BEDTIME
Status: DISCONTINUED | OUTPATIENT
Start: 2017-02-10 | End: 2017-02-11 | Stop reason: HOSPADM

## 2017-02-10 RX ORDER — GABAPENTIN 300 MG/1
300 CAPSULE ORAL ONCE
Status: COMPLETED | OUTPATIENT
Start: 2017-02-10 | End: 2017-02-10

## 2017-02-10 RX ORDER — HYDROCODONE BITARTRATE AND ACETAMINOPHEN 5; 325 MG/1; MG/1
1-2 TABLET ORAL EVERY 6 HOURS PRN
Qty: 60 TABLET | Refills: 0 | Status: ON HOLD | OUTPATIENT
Start: 2017-02-10 | End: 2017-03-07

## 2017-02-10 RX ORDER — SIMVASTATIN 20 MG
20 TABLET ORAL EVERY EVENING
Status: DISCONTINUED | OUTPATIENT
Start: 2017-02-10 | End: 2017-02-11 | Stop reason: HOSPADM

## 2017-02-10 RX ORDER — DIPHENHYDRAMINE HCL 25 MG
25 CAPSULE ORAL EVERY 6 HOURS PRN
Status: DISCONTINUED | OUTPATIENT
Start: 2017-02-10 | End: 2017-02-11 | Stop reason: HOSPADM

## 2017-02-10 RX ORDER — LORAZEPAM 0.5 MG/1
0.5 TABLET ORAL
Status: DISCONTINUED | OUTPATIENT
Start: 2017-02-10 | End: 2017-02-11 | Stop reason: HOSPADM

## 2017-02-10 RX ORDER — GABAPENTIN 600 MG/1
1200 TABLET ORAL 2 TIMES DAILY
Status: DISCONTINUED | OUTPATIENT
Start: 2017-02-10 | End: 2017-02-11 | Stop reason: HOSPADM

## 2017-02-10 RX ORDER — NORTRIPTYLINE HYDROCHLORIDE 50 MG/1
50 CAPSULE ORAL AT BEDTIME
Status: DISCONTINUED | OUTPATIENT
Start: 2017-02-10 | End: 2017-02-11 | Stop reason: HOSPADM

## 2017-02-10 RX ORDER — GLIMEPIRIDE 1 MG/1
2 TABLET ORAL 2 TIMES DAILY
Status: DISCONTINUED | OUTPATIENT
Start: 2017-02-10 | End: 2017-02-11 | Stop reason: HOSPADM

## 2017-02-10 RX ORDER — LIRAGLUTIDE 6 MG/ML
1.2 INJECTION SUBCUTANEOUS DAILY
Status: DISCONTINUED | OUTPATIENT
Start: 2017-02-10 | End: 2017-02-11 | Stop reason: HOSPADM

## 2017-02-10 RX ORDER — CYCLOBENZAPRINE HCL 10 MG
10 TABLET ORAL 3 TIMES DAILY PRN
Qty: 42 TABLET | Refills: 3 | Status: SHIPPED | OUTPATIENT
Start: 2017-02-10 | End: 2017-02-11

## 2017-02-10 RX ORDER — LIRAGLUTIDE 6 MG/ML
1.2 INJECTION SUBCUTANEOUS DAILY
COMMUNITY

## 2017-02-10 RX ORDER — METOPROLOL SUCCINATE 25 MG/1
25 TABLET, EXTENDED RELEASE ORAL DAILY
Status: DISCONTINUED | OUTPATIENT
Start: 2017-02-10 | End: 2017-02-11 | Stop reason: HOSPADM

## 2017-02-10 RX ORDER — LORAZEPAM 0.5 MG/1
.5-1 TABLET ORAL 2 TIMES DAILY PRN
Status: DISCONTINUED | OUTPATIENT
Start: 2017-02-10 | End: 2017-02-11 | Stop reason: HOSPADM

## 2017-02-10 RX ORDER — HYDROXYZINE HYDROCHLORIDE 25 MG/1
25 TABLET, FILM COATED ORAL 3 TIMES DAILY PRN
Status: DISCONTINUED | OUTPATIENT
Start: 2017-02-10 | End: 2017-02-11 | Stop reason: HOSPADM

## 2017-02-10 RX ORDER — ALBUTEROL SULFATE 90 UG/1
2 AEROSOL, METERED RESPIRATORY (INHALATION) EVERY 6 HOURS PRN
Status: DISCONTINUED | OUTPATIENT
Start: 2017-02-10 | End: 2017-02-11 | Stop reason: HOSPADM

## 2017-02-10 RX ORDER — DIPHENHYDRAMINE HYDROCHLORIDE 50 MG/ML
25 INJECTION INTRAMUSCULAR; INTRAVENOUS EVERY 6 HOURS PRN
Status: DISCONTINUED | OUTPATIENT
Start: 2017-02-10 | End: 2017-02-11 | Stop reason: HOSPADM

## 2017-02-10 RX ADMIN — METFORMIN HYDROCHLORIDE 1000 MG: 500 TABLET ORAL at 17:26

## 2017-02-10 RX ADMIN — CYCLOBENZAPRINE HYDROCHLORIDE 10 MG: 10 TABLET, FILM COATED ORAL at 16:07

## 2017-02-10 RX ADMIN — INSULIN ASPART 1 UNITS: 100 INJECTION, SOLUTION INTRAVENOUS; SUBCUTANEOUS at 21:17

## 2017-02-10 RX ADMIN — GABAPENTIN 1200 MG: 600 TABLET, FILM COATED ORAL at 22:15

## 2017-02-10 RX ADMIN — HYDROXYZINE HYDROCHLORIDE 50 MG: 50 TABLET, FILM COATED ORAL at 22:15

## 2017-02-10 RX ADMIN — NORTRIPTYLINE HYDROCHLORIDE 50 MG: 50 CAPSULE ORAL at 22:15

## 2017-02-10 RX ADMIN — ACETAMINOPHEN 975 MG: 325 TABLET, FILM COATED ORAL at 04:09

## 2017-02-10 RX ADMIN — OXYCODONE HYDROCHLORIDE 10 MG: 5 TABLET ORAL at 22:15

## 2017-02-10 RX ADMIN — Medication 2 MG: at 19:05

## 2017-02-10 RX ADMIN — Medication: at 22:16

## 2017-02-10 RX ADMIN — HYDROMORPHONE HYDROCHLORIDE: 10 INJECTION, SOLUTION INTRAMUSCULAR; INTRAVENOUS; SUBCUTANEOUS at 05:56

## 2017-02-10 RX ADMIN — POTASSIUM CHLORIDE AND SODIUM CHLORIDE: 900; 150 INJECTION, SOLUTION INTRAVENOUS at 08:20

## 2017-02-10 RX ADMIN — OMEPRAZOLE 20 MG: 20 CAPSULE, DELAYED RELEASE ORAL at 16:07

## 2017-02-10 RX ADMIN — SIMVASTATIN 20 MG: 20 TABLET, FILM COATED ORAL at 22:15

## 2017-02-10 RX ADMIN — OXYCODONE HYDROCHLORIDE 10 MG: 5 TABLET ORAL at 11:57

## 2017-02-10 RX ADMIN — GLIMEPIRIDE 2 MG: 1 TABLET ORAL at 11:47

## 2017-02-10 RX ADMIN — TIZANIDINE 8 MG: 4 TABLET ORAL at 22:15

## 2017-02-10 RX ADMIN — INSULIN ASPART 7 UNITS: 100 INJECTION, SOLUTION INTRAVENOUS; SUBCUTANEOUS at 01:08

## 2017-02-10 RX ADMIN — CEFAZOLIN SODIUM 2 G: 2 INJECTION, SOLUTION INTRAVENOUS at 00:54

## 2017-02-10 RX ADMIN — METOPROLOL SUCCINATE 25 MG: 25 TABLET, EXTENDED RELEASE ORAL at 11:56

## 2017-02-10 RX ADMIN — ACETAMINOPHEN 975 MG: 325 TABLET, FILM COATED ORAL at 19:05

## 2017-02-10 RX ADMIN — OXYCODONE HYDROCHLORIDE 10 MG: 5 TABLET ORAL at 09:03

## 2017-02-10 RX ADMIN — OXYCODONE HYDROCHLORIDE 10 MG: 5 TABLET ORAL at 15:39

## 2017-02-10 RX ADMIN — GABAPENTIN 300 MG: 300 CAPSULE ORAL at 19:05

## 2017-02-10 RX ADMIN — DIPHENHYDRAMINE HYDROCHLORIDE 25 MG: 25 CAPSULE ORAL at 05:55

## 2017-02-10 RX ADMIN — ACETAMINOPHEN 975 MG: 325 TABLET, FILM COATED ORAL at 11:56

## 2017-02-10 RX ADMIN — OMEPRAZOLE 20 MG: 20 CAPSULE, DELAYED RELEASE ORAL at 09:03

## 2017-02-10 RX ADMIN — GABAPENTIN 1200 MG: 600 TABLET, FILM COATED ORAL at 09:03

## 2017-02-10 RX ADMIN — METFORMIN HYDROCHLORIDE 1000 MG: 500 TABLET ORAL at 09:03

## 2017-02-10 RX ADMIN — INSULIN ASPART 5 UNITS: 100 INJECTION, SOLUTION INTRAVENOUS; SUBCUTANEOUS at 11:46

## 2017-02-10 RX ADMIN — OXYCODONE HYDROCHLORIDE 10 MG: 5 TABLET ORAL at 19:10

## 2017-02-10 RX ADMIN — INSULIN ASPART 2 UNITS: 100 INJECTION, SOLUTION INTRAVENOUS; SUBCUTANEOUS at 08:13

## 2017-02-10 RX ADMIN — CEFAZOLIN SODIUM 2 G: 2 INJECTION, SOLUTION INTRAVENOUS at 09:09

## 2017-02-10 RX ADMIN — Medication 2 LOZENGE: at 05:55

## 2017-02-10 RX ADMIN — CYCLOBENZAPRINE HYDROCHLORIDE 10 MG: 10 TABLET, FILM COATED ORAL at 01:06

## 2017-02-10 RX ADMIN — FLUOXETINE 20 MG: 20 CAPSULE ORAL at 09:03

## 2017-02-10 RX ADMIN — CYCLOBENZAPRINE HYDROCHLORIDE 10 MG: 10 TABLET, FILM COATED ORAL at 09:03

## 2017-02-10 RX ADMIN — INSULIN ASPART 5 UNITS: 100 INJECTION, SOLUTION INTRAVENOUS; SUBCUTANEOUS at 17:31

## 2017-02-10 NOTE — DISCHARGE SUMMARY
Discharge Summary    Attending Physician:  Roxie Qureshi MD  Admit Date: 2/9/2017    Discharge Date: 2/11/17  Primary Care Physician: Luca Schuster    Discharge Diagnoses  [unfilled]    Discharge Exam    AAOx3 SEYMOUR f/c all for , no weakness, No new sensory deficit, incision C/D/I        Preliminary Discharge Medications    This list of medications is preliminary and tentative.  Please see the After Visit Summary for the final and accurate medication list.    [unfilled]    Procedures Performed and Findings  Procedure(s):  C5-C7 Anterior and posterior revision and fusion  - Wound Class: I-Clean       Consultations Obtained  HOSPITALIST IP CONSULT  OCCUPATIONAL THERAPY ADULT IP CONSULT  PHYSICAL THERAPY ADULT IP CONSULT  PAIN MANAGEMENT IP CONSULT  SOCIAL WORK IP CONSULT    Code Status   Full Code    Discharge Disposition        Diet on Discharge   Regular    Activity on Discharge   Your activity upon discharge: Ad junior within following limitations:  No excessive activities   No Bending, Twisting, climbing, Crawling,   No lifting more than 8 lb for 2 weeks, or 15 lb for 2 months or 25 lb for 4 months or 35 lb for 6 months  Brace for riding cars for 4-6 months      Discharge Instructions  Per [facility] instruction : [email or web address]        Follow-Up Scheduled    Follow up in 2 weeks with me or PCP for wound check ( patient's choice) if patients want to go to PCP for wound check, then f/u in my office  In 3 months      Hospital Course   Hospital Course unremarkable , adequate ambulation in due time, pain controlled , cleared by PT/OT, no events

## 2017-02-10 NOTE — CONSULTS
SWS    D: Per MD request to assist with discharge planning. Chart reviewed noting pt's admit yesterday after back surgery, noted MD anticipation of pt's discharge to home tomorrow. Noted PT assessment and progress with also anticipation of pt's return home on discharge with assist of significant other as needed. Pt lives with her significant other, Anahi in their mobile home in [facility], prior to surgery she had been independent with ambulation and ADLs.      A: Based on progress and support available anticipate pt's return home on discharge with assist of significant other as needed.     P: No SW needs identified at this time, available until discharge should needs arise.

## 2017-02-10 NOTE — PLAN OF CARE
Problem: Goal Outcome Summary  Goal: Goal Outcome Summary  Surgeon Discharge Plan: d/c home tomorrow     Current Functional Status: Partner present for treatment. Pt verbalizes spine precautions without verbal cues form PT. Pt ambulated 200' CGA without AD or cervical brace. Pt is unsteady at times with ambulation, however pt states this is normal with her meniere's. SBA for bed mobility with verbal cues for log roll technique. SBA for sit <> stand transfers.     Plan: rec d/c home with assist.     Barriers to Plan/Home: Stair ambulation.

## 2017-02-10 NOTE — PROGRESS NOTES
DAILY PROGRESS NOTE    Jessica Rucker is a 51 year old old female admitted on 2/9/2017  1:32 PM.    Subjective  Axial pain      Objective    AAOx3, SEYMOUR , f/c 4/4 long standing right C6 radiculopathy still there  Ambulating,     HEMOGLOBIN   Date Value Ref Range Status   02/10/2017 11.8 11.7 - 15.7 g/dL Final   ]      Impression / Plan       Plan for today:    Patient doing well  D/c drain   Ambulate with help  PT OT, possibly clearance   D/c devika this am  Full diet  Today  Wean and d/c PCA and transition to PO meds today     D/c home tomorrow

## 2017-02-10 NOTE — ANESTHESIA CARE TRANSFER NOTE
Patient: Jessica Rucker    Procedure(s):  C5-C7 Anterior and posterior revision and fusion  - Wound Class: I-Clean    Diagnosis: non union   Diagnosis Additional Information: No value filed.    Anesthesia Type:   General, ETT     Note:  Airway :Face Mask  Patient transferred to:PACU  Comments: Pt Sv good gas exchange, awake OP sxn, suff down extubated prepare to transfer to pacu.  Report to pacu rn vss      Vitals: (Last set prior to Anesthesia Care Transfer)    CRNA VITALS  2/9/2017 1744 - 2/9/2017 1819      2/9/2017             Resp Rate (observed): (!) 2                Electronically Signed By: MARY Posey CRNA  February 9, 2017  6:19 PM

## 2017-02-10 NOTE — PLAN OF CARE
Problem: Goal Outcome Summary  Goal: Goal Outcome Summary  OT: No IP OT needs per conversation with PT, will complete order.

## 2017-02-10 NOTE — PLAN OF CARE
Problem: Goal Outcome Summary  Goal: Goal Outcome Summary  PT: Chart reviewed. Initial Evaluation performed today. Pt is a 51 yo female s/p C5-7 disectomy and fusion secondary to C6 radiculopathy POD #1. Further complicated by Hx of DMII with neuropathy, Asthma, HTN, HLD, GERD, and Meniere's disease. Pt has difficulty with bed mobility, transfers, ambulation, stairs, and activity tolerance due to pain, decreased cervical ROM, and impaired balance. Pt presents with good prognosis and would benefit from skilled PT services in order to address activity limitations. Pt lives in mobile home with partner and has 4 steps to enter.     Surgeon Discharge Plan: d/c home tomorrow    Current Functional Status: Pt instructed on Spine Precautions. Pt ambulated 15' in room CGA without AD or cervical brace. Pt needed 2-3 UE balance checks initially during ambulation and is then stale. Initial standing pt is dizzy, however she states this is normal with her Meniere's disease. Min A for log roll supine to sit EOB with verbal cues for technique and trunk support. CGA for sit <> stand transfer with verbal cues to follow spine precautions. Pt sitting in chair at end of treatment with cervical brace on for comfort and support.     Barriers to Plan/Home: Stair ambulation. Pt will have assist from partner at home.

## 2017-02-10 NOTE — CONSULTS
[facility]  Pain Service Consultation     Date of Admission:  2/9/2017  Date of Consult (When I saw the patient): 02/10/2017    Assessment and Plan  Jessica Rucker is a 51 year old female who was admitted on 2/9/2017. I was asked to see the patient for acute postoperative pain. In the setting of chronic anxiety on chronic benzodiazapine's Meniere's disease, chronic low back pain  And Fibromyalgia  .     1) Acute on chronic pain s/p cervical fusion in the setting of chronic radicular neck pain, focal low back pain, fibromyalgia   Musculoskeletal, Neuropathic right arm radiculitis     Related symptoms:  Pain radiates to  Right arm medially C7  Low back pain focal over coccyx and some right upper lateral thigh numbness to mid quardraceps    2)  Patient with chronic pain, on chronic opioid therapy managed by  Katja Estrada MD  [place]  database review: noted for hydrocodone( Vicodin)  5/325 mg, Ativan 0.5 mg, Oxazepam 10 mg   15 mg Daily Morphine Equivalent based on 10 mg hydrocodone( Vicodin)   Patient has a  low opioid tolerance.     Patient's opioid use thus far: =  30 mg Daily Morphine Equivalent    3)  Opioid induced side-effects:  -None       4) Other/Related:    -Depression/anxiety yes   -Deconditioning yes   - Insomnia     PLAN:   1)  Add Gabapentin 300 mg tonight and then daily ~noon and continue 1200 mg  Bid   2)Multimodal Medication Therapy  Topical: add ketoprofen gel , no lidoderm as she has not found benefit in past  NSAIDS: no oral   Muscle Relaxants: d/c flexeril and resume Tizanidine, home schedule ( 8 mg tonight and 4 mg in am   Adjuvants: Schedule Hydroxyzine for tonight as this is her home dose, hold for sedation   Antidepresants/anxiolytics: no changes   Opioids : continue Oxycodone as ordered   4)Non-medication interventions   ice   5)Constipation Prophylaxis   none needed   6) DC safety  -Opioid Safety information included in After Visit Summary (AVS)    Time Spent on This  Encounter  I spent  55  minutes is assessment of the patient and discussion with the patient and family.  Another 10  minutes in review of chart, documentation and discussion with the health care team.    Criselda Mcmahon-Corey HERNANDEZ CNP  Pain Management and Palliative Care  [facility]  Pgr: [phone]      Reason for Consult  Reason for consult: I was asked by Roxie Baldwin MD  to evaluate this patient for acute postoperative pain management     Primary Care Physician  Primary Care Physician:Luca Schuster  Pain Specialist:  Katja Estrada MD [facility]    Chief Complaint   acute post operative pain, low back pain and fibtomyalgia     History is obtained from the patient and electronic health record    History of Present Illness  Jessica Rucker is a 51 year old female who presents with a pain history 10 years.  She is under the care of Dr. Katja Estrada for her chronic pain.  She was admitted for cervical fusion as her prior fusion was non union status.  She is post op day 1 for C5-7 anterior discectomy fusion. Patient reports that her chronic pain is slightly worsen as a result of her surgery and neck pain preoperatively.   She continues to have right thumb numbness with preoperatively she has digit numbness of the first-third digits.  She continues with radiating pain and weakness in right arm medially to the first - third digits. She c/o headache and dizziness, hearing changes and tinnitus( from meniere). She feels headaches from the dilaudid.     CURRENT PAIN:  Her pain is located in the  Neck and right arm, low back and diffuse widespread.    It is described as Shooting,, Tiring, Unbearable, Other and constant and dull  She rates it as ranging between 8/10 and 10/10  The average is 8/10 on a scale of 0-10  Currently it is rated as 8/10  It improves by not much helping,   It worsens by movement   She  been compliant with the recommendations while  in the hospital.      PAIN HISTORY:  The pain is mainly located in the neck , left arm, back and diffuse widespread  It is described as Shooting,, Tiring, Unbearable, Other and constant and dull  Rates it as ranging between 5/10 and 10/10  Currently it is rated as 8/10  It improves by  Pain medication made very little difference    It worsens by  Sudden movements,  Stress and mood   She  been compliant with the recommendations while in the hospital.    PAST PAIN TREATMENT:   Medications: Gabapentin, hydrocodone( Vicodin), tizanidine, Hydroxyzine   Non-phamacologic modalities: herbal med somnapure,   Previous interventions/surgeries: C fusion, low back L5-S1 fusion         D.I.R.E Score: Patient Selection for Chronic Opioid Analgesia    For each factor, rate the patient's score from 1 - 3 based on the explanations on the right.       Diagnosis             2         1 = Benign chronic condition with minimal objective findings or no definite medical diagnosis.  Examples:  fibromyalgia, migraine, headaches, non-specific back pain.  2 = Slowly progressive condition concordant with moderate pain, or fixed condition with moderate objective findings.  Examples: failed back surgery syndrome, back pain with moderate degenerative changes, neuropathic pain.  3 = Advanced condition concordant with severe pain with objective findings.  Examples: severe ischemic vascular disease, advanced neuropathy, severe spinal stenosis.    Intractability             2         1 = Few therapies have been tried and the patient takes a passive role in his/her pain management process.   2 = Most costomary treatments have been tried but the patient is not fully engaged in the pain management process, or barriers prevent (insurance, transportation, medical illness)  3 = Patient fully engaged in a spectrum of appropriate treatments but with inadequate response.    Risk   (Risk = Total of P+C+R+S below)       Psychological             2         1 =  Serious personality dysfunction or mental illness interfering with care.  Examples: personality disorder, severe affective disorder, significant personality issues.  2 = Personality or mental health interferes moderately.  Example: depression or anxiety disorder.  3 = Good communication with the clinic.  No significant personality dysfunction or mental illness.       Chemical      Health   3                   1 = Active or very recent use of illicit drugs, excessive alcohol, or prescription drug abuse.  2 = Chemical coper (uses medications to cope with stress) or history of chemical dependency in remission.  3 = No CD history.  Not drug-focused or chemically reliant       Reliability             3         1 = History of numerous problems: medication misuse, missed appointments, rarely follows through.  2 = Occasional difficulties with compliance, but generally reliable.  3 = Highly reliable patient with medications, appointments and treatment.       Social      Support             2         1= Life in chaos.  Little family support and few close relationships.  Loss of most normal life roles.  2 = Reduction in some relationships and life roles.  3 = Supportive family/close relationships.  Involved in work or school and no social isolation.    Efficacy score             1        1 = Poor function or minimal pain relief despite moderate to high doses.  2 = Moderate benefit with function improved in a number of ways (or insufficient info - hasn't tried opioid yet or very low doses or too short a trial.  3 = Good improvement in pain and function and quality of life with stable doses over time.                                    15    Total score = D + I + R + E    Score 7-13: Not a suitable candidate for long-term opioid analgesia  Score 14-21: May be a good candidate for long-term opioid analgesia    Copyright 2013 Quinn Nash MD, The DIRE Score: Predicting Outcomes of Opioid Prescribing for Chronic Pain. The  Journal of Pain. 7(9) (September)2006:671-681    Past Medical History  I have reviewed this patient's medical history and updated it with pertinent information if needed.   Past Medical History   Diagnosis Date     Uncomplicated asthma      Gastro-oesophageal reflux disease      Type 2 diabetes mellitus without complications (H)      type 2     PONV (postoperative nausea and vomiting)      Hyperlipidemia      Other chronic pain      mid to low back & radiates down right leg     Numbness and tingling      right leg     Anxiety      Depression      Neuropathy (H)      Hypertension      No cardiologist     Meniere's disease      Past Medical History   Diagnosis Date     Uncomplicated asthma      Gastro-oesophageal reflux disease      Type 2 diabetes mellitus without complications (H)      type 2     PONV (postoperative nausea and vomiting)      Hyperlipidemia      Other chronic pain      mid to low back & radiates down right leg     Numbness and tingling      right leg     Anxiety      Depression      Neuropathy (H)      Hypertension      No cardiologist     Meniere's disease        Past Surgical History  I have reviewed this patient's surgical history and updated it with pertinent information if needed.  Past Surgical History   Procedure Laterality Date     Hernia repair       left inguinal hernia repair      section       Gyn surgery       hysterectomy     Shoulder surgery       right x 6     Back surgery       cervical fusion Dec 2013     Fusion spine posterior minimally invasive one level  2014     Procedure: FUSION SPINE POSTERIOR MINIMALLY INVASIVE ONE LEVEL;  Surgeon: Roxie Qureshi MD;  Location: [facility] OR     Fusion cervical posterior two levels N/A 2017     Procedure: FUSION CERVICAL POSTERIOR TWO LEVELS;  Surgeon: Roxie Qureshi MD;  Location: [facility] OR     Past Surgical History   Procedure Laterality Date     Hernia repair       left inguinal hernia repair      section       Gyn surgery        hysterectomy     Shoulder surgery       right x 6     Back surgery       cervical fusion Dec 2013     Fusion spine posterior minimally invasive one level  5/22/2014     Procedure: FUSION SPINE POSTERIOR MINIMALLY INVASIVE ONE LEVEL;  Surgeon: Roxie Qureshi MD;  Location: [facility] OR     Fusion cervical posterior two levels N/A 2/9/2017     Procedure: FUSION CERVICAL POSTERIOR TWO LEVELS;  Surgeon: Roxie Qureshi MD;  Location: [facility] OR       Prior to Admission Medications  Prior to Admission Medications   Prescriptions Last Dose Informant Patient Reported? Taking?   FLUoxetine HCl (PROZAC PO) 2/8/2017 at Unknown time  Yes Yes   Sig: Take 20 mg by mouth daily   GABAPENTIN PO 2/9/2017 at Unknown time  Yes Yes   Sig: Take 1,200 mg by mouth 2 times daily   HYDROXYZINE HCL PO 2/8/2017 at Unknown time  Yes Yes   Sig: Take 25 mg by mouth 3 times daily as needed for other (anxiety)   LORAZEPAM PO Past Week at Unknown time  Yes Yes   Sig: Take 0.5 mg by mouth nightly as needed for anxiety    METFORMIN HCL PO 2/8/2017 at Unknown time  Yes Yes   Sig: Take 1,000 mg by mouth 2 times daily (with meals)   METOPROLOL SUCCINATE ER PO More than a month at Unknown time  Yes No   Sig: Take 25 mg by mouth daily   NORTRIPTYLINE HCL PO 2/8/2017 at Unknown time  Yes Yes   Sig: Take 50 mg by mouth At Bedtime    OMEPRAZOLE PO 2/9/2017 at Unknown time  Yes Yes   Sig: Take 20 mg by mouth 2 times daily (before meals)   OXAZEPAM PO 2/9/2017 at Unknown time  Yes Yes   Sig: Take 10 mg by mouth 2 times daily   SIMVASTATIN PO 2/8/2017 at Unknown time  Yes Yes   Sig: Take 20 mg by mouth daily   TIZANIDINE HCL PO 2/9/2017 at Unknown time  Yes Yes   Sig: Take 4 mg by mouth every morning   TIZANIDINE HCL PO 2/8/2017 at Unknown time  Yes Yes   Sig: Take 8 mg by mouth At Bedtime   albuterol (PROAIR HFA, PROVENTIL HFA, VENTOLIN HFA) 108 (90 BASE) MCG/ACT inhaler More than a month at Unknown time  Yes No   Sig: Inhale 2 puffs into the lungs every 6 hours as  needed    glimepiride (AMARYL) 2 MG tablet 2/9/2017 at Unknown time  Yes Yes   Sig: Take 2 mg by mouth 2 times daily   liraglutide (VICTOZA) 18 MG/3ML soln  Pharmacy Yes Yes   Sig: Inject Subcutaneous daily      Facility-Administered Medications: None     Allergies  Allergies   Allergen Reactions     Tetanus Toxoid Anaphylaxis     Wellbutrin [Bupropion] Other (See Comments)     confusion     Adhesive Tape Rash     Cloth and paper tape ok       Social History  I have reviewed this patient's social history and updated it with pertinent information if needed. Jessica Rucker  reports that she has quit smoking. Her smoking use included Cigarettes. She has a 5 pack-year smoking history. She has never used smokeless tobacco. She reports that she does not drink alcohol or use illicit drugs.    Family History  I have reviewed this patient's family history and updated it with pertinent information if needed.   History reviewed. No pertinent family history.    Review of Systems  The 10 point Review of Systems is negative other than noted in the HPI or here depression, dizziness hearing changes, tinnitus, constipation, diarrhea, diabetes, ankle edema .     Physical Exam  Temp:  [96.3  F (35.7  C)-98.9  F (37.2  C)] 98.9  F (37.2  C)  Pulse:  [103-106] 103  Heart Rate:  [] 104  Resp:  [10-26] 16  BP: (115-151)/() 133/81 mmHg  FiO2 (%):  [100 %] 100 %  SpO2:  [92 %-100 %] 97 %  218 lbs 3.2 oz  GEN:  Alert, oriented x 3, appears comfortable, NAD.  HEENT:  Normocephalic/atraumatic, no scleral icterus, no nasal discharge, mouth moist.  CV:  RRR, S1, S2; no murmurs or other irregularities noted.  +3 DP/PT pulses bilatererally; no edema BLE.  RESP:  Clear to auscultation bilaterally without rales/rhonchi/wheezing/retractions.  Symmetric chest rise on inhalation noted.  Normal respiratory effort.  ABD:  Rounded, soft, non-tender/non-distended.  +BS  EXT:  Edema & pulses as noted above.  CMS intact x 4.     M/S:   Tender  to palpation  Cervical spine and paraspinal .    SKIN:  Dry to touch, no exanthems noted in the visualized areas.    NEURO: Symmetric strength +5/5.  Sensation to touch intact all extremities.    PAIN BEHAVIOR: Cooperative  Psych:  Normal affect.  Calm, cooperative, conversant appropriately.       Data   relevant labs and imaging reviewed

## 2017-02-10 NOTE — ANESTHESIA POSTPROCEDURE EVALUATION
Patient: Jessica Rucker    Procedure(s):  C5-C7 Anterior and posterior revision and fusion  - Wound Class: I-Clean    Diagnosis:non union   Diagnosis Additional Information: Status post C5-7 anterior cervical discectomy and fusion with nonunion  DM  Obesity  BMI 35  Osteoporosis    Anesthesia Type:  General, ETT    Note:  Anesthesia Post Evaluation    Patient location during evaluation: PACU  Patient participation: Able to fully participate in evaluation  Level of consciousness: awake and alert  Pain management: adequate  Airway patency: patent  Cardiovascular status: acceptable  Respiratory status: acceptable  Hydration status: acceptable  PONV: none     Anesthetic complications: None          Last vitals:  Filed Vitals:    02/09/17 1845 02/09/17 1900 02/09/17 1915   BP: 150/99 151/100 142/104   Temp:      Resp: 10 26 18   SpO2: 96% 98% 100%         Electronically Signed By: Ernst Amos MD  February 9, 2017  7:37 PM

## 2017-02-10 NOTE — PROVIDER NOTIFICATION
diabetic, no orders. controlled on po meds at home. clears here post surgery so need sl scale orders. Thanks  Sent to admitting pager via web based page. Night nurse updated.

## 2017-02-10 NOTE — OP NOTE
REPORT OF OPERATION  Jessica Rucker is a 51 year old old female admitted on 2/9/2017  1:32 PM.  ?  Operative Date:  2/9/2017  ?  PRE-PROCEDURE DIAGNOSIS:   Status post C5-7 anterior cervical discectomy and fusion withnone union  DM  Obesity  BMI 35  Osteoporosis    POST-PROCEDURE DIAGNOSIS:  same  PROCEDURE PERFORMED:  1) Posterior C5-7 lateral mass instrumentation and fusion with lateral mass screws and tricalcium phosphate.  WithC5/6/7 right foraminotomy  And facetectomy     2) Use of intraoperative fluoroscope and electrophysiological monitoring.    PRESENTATION:  Please refer to my clinic notes for full details but, in short, the patient is a 51 -year-old female with severe spinal stenosis, degenerative disc disease and disc herniation. We performed C5-7 ACDF more than a year ago but fusion did not happen , patient has osteoporosis and DM. D Patient had been consented and all risks and benefits were explained, the patient is taken today for additional posterior instrumentation and fusion for proper stability.  PROCEDURE  : The patient was taken to surgery. After general anesthesia was applied and SDCs placed, the patient was turned into prone position on the Emil table with Andrés frame. After the patient had been secured and arms tucked securely and comfortably to the side, the area of surgery was identified and marked with a midline incision down to the C5-7 area. Back of the head had been clipped and then the patient was prepped and draped in sterile fashion. After local anesthetic was injected to midline incision , then we opened the skin with a scalpel and extend down to the fascia with a Bovie and then go on both sides of the spinal process of surgical area down and put retractors in place. Hemostasis has been achieved with bipolar coagulation and Bovie. After retractors are placed and landmarks are identified and after x-rays with fluoroscopic imaging confirm the correct level, then we put a small  starting hole with a drill in the lateral mass with a high-speed drill and use drill in each lateral mass on each side to go down on the lateral mass in a superolateral direction. Then the holes are tapped before screws are placed as followed    C5 12 mm  C6 12 mm  C7 12 mm  Then we put adequate length sheila in place which is bent to proper position and lock it in place. After resurfacing the bone in the lateral mass, after copious irrigation with antibiotic solutionwe then put  tricalcium phosphate on both sides and then put two muscle stitches to close the gap, 0 Vicryl in the fascial level and 2-0 Vicryl in the skin and subcu level. Then we run the skin with 2-0 nylon in uninterrupred locked fashion, apply Bacitracin, Telfa and 4 x 4 tape. Surgery is without complication. All counts are correct at the end of the procedure.  EBL was 100cc.    Obesity made the approach more time demanding     DISPOSITION:  To PACU for postoperative antibiotic and pain management.  Roxie Qureshi MD  ?  cc: Roxie Qureshi MD

## 2017-02-10 NOTE — CONSULTS
Hospitalist Consultation      Jessica Rucker MRN# [id number]   YOB: 1965 Age: 51 year old   Date of Admission: 2/9/2017     Requesting Physician:  Dr. Qureshi   Reason for consult: Medical mgmt           Assessment and Plan:   This patient is a 51 year old female with a PMH significant for DM II, HTN, asthma, Meniers disease, anxiety/depression, and GERD who is POD 1 s/p cervical fusion.    1. S/p cervical fusion: doing well, cont PT/OT and pain control as per primary.     2. DM II- Elevated blood sugars as high 408. This AM was 208 and A1c was 10.6. At home she is on Glimepiride, Victoza and Metformin only. She states A1c was previously 14 and is now 10 so showing improvement.  -Restarted home meds  -Added sliding scale  -She has a referral to meet with endocrinology in the future.    3. HTN  Has been on Diazide, but potassium today is elevated so will hold. Consider discharge on different medication. States she does not tolerate Lisinopril and used to be on Metoprolol but lost the bottle so did not refill.  - Holding Diazide given elevated K  -Restart Metoprolol    4. Hyperkalemia  Most likely related to Diazide med. Will hold now  -Recheck Potassium now and if still high give 1x dose Kayexelate    5. Elevated WBC  WBC is slightly elevated, but no evidence of fever or complaints of infection.   -Continue to monitor vital signs closely and if fever obtain fever work up.  -Repeat CBC in AM    6. Depression/Anxiety  -Continue Prozac and Nortriptyline    7. Menieres disease  Scheduled to see ENT doctor for possible surgery.    8. HLP  Restart Statin      DVT Prophylaxis: on PCDs as per primary  D/C planning: To hpme once cleared by surgery             History of Present Illness:   This patient is a 51 year old female who is POD 1 s/p cervical fusion  Intra-op report reviewed and showed no intra-op complications.   I/o's reviewed, Currently net positive with good UOP since OR. Hgb stable this am at  11.8.  Overnight did pretty well, no complaints, VSS.   Currently, today dee dee diet, pain controlled, no CP, SOB, no n/v.   O/w other medical problems have been stable, with no recent c/o illness.               Past Medical History:     Past Medical History   Diagnosis Date     Uncomplicated asthma      Gastro-oesophageal reflux disease      Type 2 diabetes mellitus without complications (H)      type 2     PONV (postoperative nausea and vomiting)      Hyperlipidemia      Other chronic pain      mid to low back & radiates down right leg     Numbness and tingling      right leg     Anxiety      Depression      Neuropathy (H)      Hypertension      No cardiologist     Meniere's disease                Past Surgical History:     Past Surgical History   Procedure Laterality Date     Hernia repair       left inguinal hernia repair      section       Gyn surgery       hysterectomy     Shoulder surgery       right x 6     Back surgery       cervical fusion Dec 2013     Fusion spine posterior minimally invasive one level  2014     Procedure: FUSION SPINE POSTERIOR MINIMALLY INVASIVE ONE LEVEL;  Surgeon: Roxie Qureshi MD;  Location:  OR                 Social History:     Social History   Substance Use Topics     Smoking status: Former Smoker -- 0.50 packs/day for 10 years     Types: Cigarettes     Smokeless tobacco: Never Used      Comment: quit 4 yrs ago     Alcohol Use: No                 Family History:   Family Hx fully reviewed and is non contributory to this admission.             Allergies:     Allergies   Allergen Reactions     Tetanus Toxoid Anaphylaxis     Wellbutrin [Bupropion] Other (See Comments)     confusion     Adhesive Tape Rash     Cloth and paper tape ok             Medications:     Prior to Admission medications    Medication Sig Last Dose Taking? Auth Provider   HYDROcodone-acetaminophen (NORCO) 5-325 MG per tablet Take 1-2 tablets by mouth every 6 hours as needed for moderate to severe  pain  Yes Roxie Qureshi MD   cyclobenzaprine (FLEXERIL) 10 MG tablet Take 1 tablet (10 mg) by mouth 3 times daily as needed for muscle spasms  Yes Roxie Qureshi MD   TIZANIDINE HCL PO Take 4 mg by mouth every morning 2/9/2017 at Unknown time Yes Unknown, Entered By History   TIZANIDINE HCL PO Take 8 mg by mouth At Bedtime 2/8/2017 at Unknown time Yes Unknown, Entered By History   OMEPRAZOLE PO Take 20 mg by mouth 2 times daily (before meals) 2/9/2017 at Unknown time Yes Unknown, Entered By History   FLUoxetine HCl (PROZAC PO) Take 20 mg by mouth daily 2/8/2017 at Unknown time Yes Reported, Patient   glimepiride (AMARYL) 2 MG tablet Take 2 mg by mouth 2 times daily 2/9/2017 at Unknown time Yes Reported, Patient   METFORMIN HCL PO Take 1,000 mg by mouth 2 times daily (with meals) 2/8/2017 at Unknown time Yes Reported, Patient   GABAPENTIN PO Take 1,200 mg by mouth 2 times daily 2/9/2017 at Unknown time Yes Reported, Patient   OXAZEPAM PO Take 10 mg by mouth 2 times daily 2/9/2017 at Unknown time Yes Reported, Patient   HYDROXYZINE HCL PO Take 25 mg by mouth 3 times daily as needed for other (anxiety) 2/8/2017 at Unknown time Yes Reported, Patient   LORAZEPAM PO Take 0.5 mg by mouth nightly as needed for anxiety  Past Week at Unknown time Yes Reported, Patient   NORTRIPTYLINE HCL PO Take 50 mg by mouth At Bedtime  2/8/2017 at Unknown time Yes Reported, Patient   SIMVASTATIN PO Take 20 mg by mouth daily 2/8/2017 at Unknown time Yes Reported, Patient   METOPROLOL SUCCINATE ER PO Take 25 mg by mouth daily More than a month at Unknown time  Reported, Patient   albuterol (PROAIR HFA, PROVENTIL HFA, VENTOLIN HFA) 108 (90 BASE) MCG/ACT inhaler Inhale 2 puffs into the lungs every 6 hours as needed  More than a month at Unknown time  Reported, Patient               Review of Systems:   A comprehensive greater than 10 system review of systems was carried out.  Pertinent positives and negatives are noted above.  Otherwise  "negative for contributory info.            Physical Exam:   Vitals were reviewed  Blood pressure 115/68, pulse 103, temperature 97.9  F (36.6  C), temperature source Oral, resp. rate 16, height 1.664 m (5' 5.5\"), weight 98.975 kg (218 lb 3.2 oz), SpO2 99 %.  Exam:    GENERAL:  Comfortable.  PSYCH: pleasant, oriented, No acute distress.  HEENT:  PERRLA. Normal conjunctiva, normal hearing, nasal mucosa and Oropharynx are normal. Neck is in brace.  NECK:  Supple, no neck vein distention, adenopathy or bruits, normal thyroid.  HEART:  Tachycardic. Normal S1, S2 with no murmur, no pericardial rub, S3 or S4.  LUNGS:  Clear to auscultation, normal Respiratory effort.  ABDOMEN:  Soft, no hepatosplenomegaly, normal bowel sounds.  EXTREMITIES:  No pedal edema, +2 pulses bilateral and equal.  SKIN:  Dry to touch, No rash, wound or ulcerations.  NEUROLOGIC:  Nonfocal with normal cranial nerve and motor power and sensation.            Data:   Past 24 hours labs, studies, and imaging were reviewed.    Recent Labs  Lab 02/10/17  0615   WBC 11.4*   HGB 11.8   HCT 36.6   MCV 80          Recent Labs  Lab 02/10/17  0615      POTASSIUM 5.4*   CHLORIDE 99   CO2 26   ANIONGAP 9   *   BUN 17   CR 0.86   GFRESTIMATED 69   GFRESTBLACK 84   СЕРГЕЙ 8.8       Megan Bernal PA-C    Pt discussed with Dr. Cabrera who agrees with the care as discussed above.       "

## 2017-02-10 NOTE — PLAN OF CARE
Problem: Goal Outcome Summary  Goal: Goal Outcome Summary  VSS pain 7/10 pca increased twice this shift.  oral flexril given for pain control. Numbness bilateral feet neuropathy.  Herrera catheter in place

## 2017-02-10 NOTE — PLAN OF CARE
Problem: Goal Outcome Summary  Goal: Goal Outcome Summary  Outcome: Improving  Tolerating clear liquids. Pain in control with pca dilaudid per pt and watching TV, talking on the phone. Neck drsg clean, dry and intact. hemovac patent. pcds in place. Herrera patent of clear javier urine.

## 2017-02-10 NOTE — PROGRESS NOTES
Infection Prevention:    Patient requires Contact Precautions because of MRSA hx. Please contact Infection Prevention with any questions/concerns at *[phone].    Jeanine Chilel, ICP

## 2017-02-10 NOTE — PROVIDER NOTIFICATION
Web based paged hospitalist on call blood sugar 332 do you want it covered?   itching red around the eyes.  can pt get meds for itching?

## 2017-02-10 NOTE — PLAN OF CARE
Problem: Goal Outcome Summary  Goal: Goal Outcome Summary  Outcome: Improving  dee dee PO well, up with assist of 1 and gait belt, PO pain meds managing pain, fortune removed at 1330 and is DTV, plans to dc to home tomorrow

## 2017-02-10 NOTE — PROGRESS NOTES
02/10/17 0850   Quick Adds   Type of Visit Initial PT Evaluation   Living Environment   Lives With significant other   Living Arrangements mobile home   Number of Stairs to Enter Home 4  (Rail on R asccending)   Number of Stairs Within Home 0   Transportation Available car;family or friend will provide   Living Environment Comment 4 cats and 2 dogs at home.    Self-Care   Dominant Hand right   Usual Activity Tolerance fair   Current Activity Tolerance poor   Regular Exercise no   Equipment Currently Used at Home cane, straight;wheelchair   Activity/Exercise/Self-Care Comment Venancio interferes at times with activity   Functional Level Prior   Ambulation 0-->independent   Transferring 0-->independent   Toileting 0-->independent   Bathing 0-->independent   Dressing 0-->independent   Eating 0-->independent   Communication 0-->understands/communicates without difficulty   Fall history within last six months yes   Number of times patient has fallen within last six months 5  (Last fall hurt her knee. No fractures)   Which of the above functional risks had a recent onset or change? ambulation;transferring   Prior Functional Level Comment Falls Risk   General Information   Onset of Illness/Injury or Date of Surgery - Date 02/09/17   Referring Physician Roxie Barrett MD   Patient/Family Goals Statement Return home. Increase functional mobility    Pertinent History of Current Problem (include personal factors and/or comorbidities that impact the POC) s/p C5-7 disectomy and fusion due to C6 radiculopathy.    Precautions/Limitations fall precautions;spinal precautions   General Info Comments Spine precuations, no BLT.    Cognitive Status Examination   Orientation orientation to person, place and time   Level of Consciousness alert   Follows Commands and Answers Questions 100% of the time;able to follow multistep instructions   Personal Safety and Judgment intact   Memory intact   Pain Assessment   Patient Currently in Pain  "Yes, see Vital Sign flowsheet  (8/10)   Integumentary/Edema   Integumentary/Edema Comments Bandaged posterior cervical spine.    Range of Motion (ROM)   ROM Comment Cervical ROM limited due to spine precuations. UE/LE WNL.    Strength   Strength Comments Bilateral LE 5/5.    Bed Mobility   Bed Mobility Comments Min A for trunk support and verbal cues for log roll tecnnique for Supine to sit transfer.    Transfer Skills   Transfer Comments CGA for sit <> stand transfer with verbal cues to follow spine precautions.   Gait   Gait Comments Ambulated 15' in room CGA without AD.    Balance   Balance Comments Uses UE 2-3 times during inital ambulation with wall/bed support for balance check.    Sensory Examination   Sensory Perception Comments Neuropathy bilateral feet. Deminished/absent L4-S1 bilateral LE. Pt states she has some numbness in her R UE this morning with diminshed C6 dermatome.    General Therapy Interventions   Planned Therapy Interventions bed mobility training;gait training;transfer training;home program guidelines;progressive activity/exercise   Clinical Impression   Criteria for Skilled Therapeutic Intervention yes, treatment indicated   PT Diagnosis Difficulty ambulating.   Influenced by the following impairments Pain, decreased ROM, neuropathy, and impaired balance.    Functional limitations due to impairments Bed mobility, transfers, ambulation, and activty tolerance.    Clinical Presentation Stable/Uncomplicated   Clinical Presentation Rationale Limited body system involvement with moderate comorbidities.    Clinical Decision Making (Complexity) Low complexity   Therapy Frequency` 2 times/day   Predicted Duration of Therapy Intervention (days/wks) 2 days   Anticipated Discharge Disposition Home with Assist   Risk & Benefits of therapy have been explained Yes   Patient, Family & other staff in agreement with plan of care Yes   [facility] AM-PAC TM \"6 Clicks\"   2016, Trustees of [employer] " "[employer], under license to [employer].  All rights reserved.   6 Clicks Short Forms Basic Mobility Inpatient Short Form   [facility] AM-PAC  \"6 Clicks\" V.2 Basic Mobility Inpatient Short Form   1. Turning from your back to your side while in a flat bed without using bedrails? 3 - A Little   2. Moving from lying on your back to sitting on the side of a flat bed without using bedrails? 3 - A Little   3. Moving to and from a bed to a chair (including a wheelchair)? 3 - A Little   4. Standing up from a chair using your arms (e.g., wheelchair, or bedside chair)? 3 - A Little   5. To walk in hospital room? 3 - A Little   6. Climbing 3-5 steps with a railing? 3 - A Little   Basic Mobility Raw Score (Score out of 24.Lower scores equate to lower levels of function) 18   Total Evaluation Time   Total Evaluation Time (Minutes) 12     "

## 2017-02-11 ENCOUNTER — APPOINTMENT (OUTPATIENT)
Dept: PHYSICAL THERAPY | Facility: CLINIC | Age: 52
DRG: 472 | End: 2017-02-11
Attending: NEUROLOGICAL SURGERY
Payer: COMMERCIAL

## 2017-02-11 VITALS
SYSTOLIC BLOOD PRESSURE: 97 MMHG | OXYGEN SATURATION: 95 % | TEMPERATURE: 99.9 F | HEART RATE: 103 BPM | WEIGHT: 218.2 LBS | RESPIRATION RATE: 18 BRPM | DIASTOLIC BLOOD PRESSURE: 58 MMHG | BODY MASS INDEX: 35.07 KG/M2 | HEIGHT: 66 IN

## 2017-02-11 LAB
BASOPHILS # BLD AUTO: 0 10E9/L (ref 0–0.2)
BASOPHILS NFR BLD AUTO: 0.3 %
DIFFERENTIAL METHOD BLD: ABNORMAL
EOSINOPHIL # BLD AUTO: 0.2 10E9/L (ref 0–0.7)
EOSINOPHIL NFR BLD AUTO: 1.8 %
ERYTHROCYTE [DISTWIDTH] IN BLOOD BY AUTOMATED COUNT: 14.7 % (ref 10–15)
GLUCOSE BLDC GLUCOMTR-MCNC: 182 MG/DL (ref 70–99)
GLUCOSE BLDC GLUCOMTR-MCNC: 310 MG/DL (ref 70–99)
HCT VFR BLD AUTO: 34.2 % (ref 35–47)
HGB BLD-MCNC: 10.2 G/DL (ref 11.7–15.7)
IMM GRANULOCYTES # BLD: 0.1 10E9/L (ref 0–0.4)
IMM GRANULOCYTES NFR BLD: 0.4 %
LYMPHOCYTES # BLD AUTO: 4.2 10E9/L (ref 0.8–5.3)
LYMPHOCYTES NFR BLD AUTO: 33.1 %
MCH RBC QN AUTO: 25.2 PG (ref 26.5–33)
MCHC RBC AUTO-ENTMCNC: 29.8 G/DL (ref 31.5–36.5)
MCV RBC AUTO: 84 FL (ref 78–100)
MONOCYTES # BLD AUTO: 0.9 10E9/L (ref 0–1.3)
MONOCYTES NFR BLD AUTO: 6.8 %
NEUTROPHILS # BLD AUTO: 7.2 10E9/L (ref 1.6–8.3)
NEUTROPHILS NFR BLD AUTO: 57.6 %
NRBC # BLD AUTO: 0 10*3/UL
NRBC BLD AUTO-RTO: 0 /100
PLATELET # BLD AUTO: 191 10E9/L (ref 150–450)
RBC # BLD AUTO: 4.05 10E12/L (ref 3.8–5.2)
WBC # BLD AUTO: 12.6 10E9/L (ref 4–11)

## 2017-02-11 PROCEDURE — 97116 GAIT TRAINING THERAPY: CPT | Mod: GP | Performed by: PHYSICAL THERAPIST

## 2017-02-11 PROCEDURE — 85025 COMPLETE CBC W/AUTO DIFF WBC: CPT | Performed by: PHYSICIAN ASSISTANT

## 2017-02-11 PROCEDURE — 00000146 ZZHCL STATISTIC GLUCOSE BY METER IP

## 2017-02-11 PROCEDURE — 97530 THERAPEUTIC ACTIVITIES: CPT | Mod: GP | Performed by: PHYSICAL THERAPIST

## 2017-02-11 PROCEDURE — 25000132 ZZH RX MED GY IP 250 OP 250 PS 637: Performed by: NEUROLOGICAL SURGERY

## 2017-02-11 PROCEDURE — 25000125 ZZHC RX 250: Performed by: PHYSICIAN ASSISTANT

## 2017-02-11 PROCEDURE — 40000193 ZZH STATISTIC PT WARD VISIT: Performed by: PHYSICAL THERAPIST

## 2017-02-11 PROCEDURE — 36415 COLL VENOUS BLD VENIPUNCTURE: CPT | Performed by: PHYSICIAN ASSISTANT

## 2017-02-11 PROCEDURE — 25000131 ZZH RX MED GY IP 250 OP 636 PS 637: Performed by: INTERNAL MEDICINE

## 2017-02-11 PROCEDURE — 25000132 ZZH RX MED GY IP 250 OP 250 PS 637: Performed by: PHYSICIAN ASSISTANT

## 2017-02-11 PROCEDURE — 25000132 ZZH RX MED GY IP 250 OP 250 PS 637: Performed by: NURSE PRACTITIONER

## 2017-02-11 RX ADMIN — GLIMEPIRIDE 2 MG: 1 TABLET ORAL at 08:38

## 2017-02-11 RX ADMIN — INSULIN ASPART 2 UNITS: 100 INJECTION, SOLUTION INTRAVENOUS; SUBCUTANEOUS at 08:37

## 2017-02-11 RX ADMIN — OXYCODONE HYDROCHLORIDE 10 MG: 5 TABLET ORAL at 08:39

## 2017-02-11 RX ADMIN — INSULIN ASPART 5 UNITS: 100 INJECTION, SOLUTION INTRAVENOUS; SUBCUTANEOUS at 01:58

## 2017-02-11 RX ADMIN — GABAPENTIN 1200 MG: 600 TABLET, FILM COATED ORAL at 08:39

## 2017-02-11 RX ADMIN — Medication: at 08:39

## 2017-02-11 RX ADMIN — OMEPRAZOLE 20 MG: 20 CAPSULE, DELAYED RELEASE ORAL at 06:37

## 2017-02-11 RX ADMIN — GABAPENTIN 300 MG: 300 CAPSULE ORAL at 11:57

## 2017-02-11 RX ADMIN — ACETAMINOPHEN 975 MG: 325 TABLET, FILM COATED ORAL at 04:55

## 2017-02-11 RX ADMIN — METFORMIN HYDROCHLORIDE 1000 MG: 500 TABLET ORAL at 08:38

## 2017-02-11 RX ADMIN — HYDROXYZINE HYDROCHLORIDE 25 MG: 25 TABLET ORAL at 11:58

## 2017-02-11 RX ADMIN — FLUOXETINE 20 MG: 20 CAPSULE ORAL at 08:38

## 2017-02-11 RX ADMIN — OXYCODONE HYDROCHLORIDE 10 MG: 5 TABLET ORAL at 04:55

## 2017-02-11 RX ADMIN — LIRAGLUTIDE 1.2 MG: 6 INJECTION SUBCUTANEOUS at 08:38

## 2017-02-11 RX ADMIN — TIZANIDINE 4 MG: 4 TABLET ORAL at 10:49

## 2017-02-11 RX ADMIN — OXYCODONE HYDROCHLORIDE 10 MG: 5 TABLET ORAL at 01:27

## 2017-02-11 RX ADMIN — OXYCODONE HYDROCHLORIDE 10 MG: 5 TABLET ORAL at 11:57

## 2017-02-11 NOTE — PROGRESS NOTES
Reviewed discharge instructions and medications with patient and family. Questions answered. Patient discharged to home with family, discharge instructions, medication (Norco), and belongings.

## 2017-02-11 NOTE — DISCHARGE INSTRUCTIONS
Managing Post-Op Pain at Home: Medicines  Pain after an operation (post-op pain) is common and expected. These guidelines can help you stay as comfortable as possible.    Taking pain medicines    Take medicines on time. Do not take more than prescribed.    Take only the medicines that your healthcare provider tells you to take.    Take pain medicines with some food to avoid an upset stomach.    Don t drink alcohol while using pain medicines.  Types of pain medicines  Non-opioid:    Over-the-counter (such as acetaminophen and ibuprofen) or prescription    All relieve mild to moderate pain and some reduce swelling    Possible side effects include stomach upset and bleeding, high doses may cause kidney or liver problems    Check with your doctor before taking over-the-counter pain medicines in addition to your prescribed pain medicine  Opioid:    Always a prescription    Relieve moderate to severe pain    Possible side effects include stomach upset, nausea, and itching    May cause constipation (to help prevent this, eat high-fiber foods and drink plenty of water)    Your doctor may recommend a stool softener  When to seek medical care  Call your doctor or seek immediate attention if you notice any of these symptoms:    Nausea, vomiting, diarrhea, lasting constipation, or stomach cramps    Breathing problems or a fast heart rate    Feeling very tired, sluggish, or dizzy    Skin rash     7391-0305 The [employer]. [address] [place] [address], [place] [address]. All rights reserved. This information is not intended as a substitute for professional medical care. Always follow your healthcare professional's instructions.        Medicine for Pain  Medicines can help to block pain, decrease inflammation, and treat related problems. More than 1 medicine may be used to treat your pain. Medicines may be changed as you feel better, or if they cause side effects.  Medicines What they do Possible side  effects   Non-opioid NSAIDs, aspirin, acetaminophen Reduce pain chemicals at the site of pain. NSAIDs can reduce joint and soft tissue inflammation. Nausea, stomach pain, ulcers, indigestion, bleeding, kidney, or liver problems. Certain NSAIDs may increase cardiovascular risk in some patients. Talk with your health care provider.   Opioids (morphine and similar medicines often called narcotics) Reduce feelings or perception of pain. Used for moderate to severe pain. Nausea, vomiting, itching, drowsiness, constipation, slowed breathing.   Other medicines (corticosteroids, antinausea, antidepressant, and antiseizure medicines) Reduce swelling, burning or tingling pain or limit certain side effects of pain medicines, like nausea or vomiting. Your health care provider will explain the possible side effects of these medicines.   Anesthetics (local, injected) include lidocaine, benzocaine, and medicines used by anesthesiologists Stop pain signals from reaching the brain by blocking feeling in the treated area. Nausea, low blood pressure, fever, slowed breathing, fainting, seizures, heart attack.   When to call the health care provider  Call your health care provider right away (or have a family member call) if you have:    Unrelieved pain    Side effects, including constipation or uncontrolled nausea, that interfere with daily activities  If you have extreme sleepiness or breathing problems, call 911.     [id number] [employer]. [address] [place] [address], [place] [address]. All rights reserved. This information is not intended as a substitute for professional medical care. Always follow your healthcare professional's instructions.        Call your physician if you experience:  1. Fever greater than 100 degrees with body chills or excessive sweating.  2. Increased redness, localized warmth, tenderness, drainage or swelling at incision site.  Opening or pulling apart of incision site.   3. Pain not controlled with  oral pain medications, ice and rest.   4. No bowel movement in 3 days (may use Milk of Magnesia or other over the counter remedy).  5. Chest pain, shortness of breath, and/or calf pain with excessive swelling.  6. Generalized feeling of illness.  7. Any other questions or concerns related to your surgery/recovery.      Thank you for allowing [facility] to participate in your cares!!

## 2017-02-11 NOTE — PLAN OF CARE
Problem: Individualization  Goal: Patient Preferences  Outcome: No Change  Pain to posterior neck. Oxycodone and chronic home meds provided. Right thumb numbness and neuropathy to bilateral feet. Strong hand grasps. Able to move arms well. Dressing dry. Drain removed per order. Up with 1 assist to the bathroom. Voiding. abd soft. Reg. Diabetic diet. Bed alarm on for safety. Declined to wear the aspen collar. Pleasant. Plans to go home at d/c. 0678 Rating pain high 7-8/10. Able to watch TV and make conversation. Ice to neck. Repositioned. Oxycodone and chronic pain meds provided.

## 2017-02-11 NOTE — PROGRESS NOTES
DAILY PROGRESS NOTE    Jessica Rucker is a 51 year old old female admitted on 2/9/2017  1:32 PM.    Subjective  Comfortable      Objective    AAOx3, SEYMOUR , f/c 4/4   Ambulating,     HEMOGLOBIN   Date Value Ref Range Status   02/11/2017 10.2* 11.7 - 15.7 g/dL Final   ]      Impression / Plan       Plan for today:  Doing very wqell d/c home today

## 2017-02-11 NOTE — PROGRESS NOTES
0145 Blood sugar 310. Text paged MD to make aware. 0215 resting between cares. Pain 7/10, had to awake to provide pain medication. Oxycodone. Dressing dry. Numbness to right thumb and bilateral feet. Voiding. Planning to go home this today. Up with 1 assist with walker. Voiding. Passing flatus. Food and fluids well. Declined wanting neck brace on when out of bed. Will monitor BS. Bed alarm on for safety.

## 2017-02-11 NOTE — PLAN OF CARE
Problem: Goal Outcome Summary  Goal: Goal Outcome Summary  PT:    Surgeon Discharge Plan: Discharge home    Current Functional Status: Pt declines wearing neck brace. Pt able to complete sit<>supine with SBA using log roll technique. Pt ambulates 300' with SBA, half with AD and half without. Pt much more stable when using walker and educated pt to use walker at home. Pt able to complete up/down 4 steps with use of 1 rail and SBA.     Barriers to Plan/Home: None. Encouraged pt to utilize assist as needed at home.     Physical Therapy Discharge Summary    Reason for therapy discharge:    Discharged to home.    Progress towards therapy goal(s). See goals on Care Plan in [facility] electronic health record for goal details.  Goals met    Therapy recommendation(s):    No further therapy is recommended.

## 2017-03-03 ENCOUNTER — HOSPITAL ENCOUNTER (INPATIENT)
Facility: CLINIC | Age: 52
LOS: 4 days | Discharge: HOME HEALTH | DRG: 857 | End: 2017-03-07
Attending: ORTHOPAEDIC SURGERY | Admitting: ORTHOPAEDIC SURGERY
Payer: COMMERCIAL

## 2017-03-03 ENCOUNTER — ANESTHESIA EVENT (OUTPATIENT)
Dept: SURGERY | Facility: CLINIC | Age: 52
DRG: 857 | End: 2017-03-03
Payer: COMMERCIAL

## 2017-03-03 ENCOUNTER — ANESTHESIA (OUTPATIENT)
Dept: SURGERY | Facility: CLINIC | Age: 52
DRG: 857 | End: 2017-03-03
Payer: COMMERCIAL

## 2017-03-03 DIAGNOSIS — M96.89 POSTOPERATIVE SURGICAL COMPLICATION INVOLVING MUSCULOSKELETAL SYSTEM ASSOCIATED WITH MUSCULOSKELETAL PROCEDURE, UNSPECIFIED COMPLICATION: Primary | ICD-10-CM

## 2017-03-03 DIAGNOSIS — M43.06 LUMBAR SPONDYLOLYSIS: ICD-10-CM

## 2017-03-03 DIAGNOSIS — T14.8XXA WOUND INFECTION: ICD-10-CM

## 2017-03-03 DIAGNOSIS — Z98.1 STATUS POST CERVICAL SPINAL FUSION: ICD-10-CM

## 2017-03-03 DIAGNOSIS — L08.9 WOUND INFECTION: ICD-10-CM

## 2017-03-03 LAB
GLUCOSE BLDC GLUCOMTR-MCNC: 118 MG/DL (ref 70–99)
GLUCOSE BLDC GLUCOMTR-MCNC: 316 MG/DL (ref 70–99)
GRAM STN SPEC: NORMAL
GRAM STN SPEC: NORMAL
MICRO REPORT STATUS: NORMAL
MICRO REPORT STATUS: NORMAL
SPECIMEN SOURCE: NORMAL
SPECIMEN SOURCE: NORMAL

## 2017-03-03 PROCEDURE — 87205 SMEAR GRAM STAIN: CPT | Performed by: ORTHOPAEDIC SURGERY

## 2017-03-03 PROCEDURE — 37000009 ZZH ANESTHESIA TECHNICAL FEE, EACH ADDTL 15 MIN: Performed by: ORTHOPAEDIC SURGERY

## 2017-03-03 PROCEDURE — E2402 NEG PRESS WOUND THERAPY PUMP: HCPCS

## 2017-03-03 PROCEDURE — 87181 SC STD AGAR DILUTION PER AGT: CPT | Performed by: ORTHOPAEDIC SURGERY

## 2017-03-03 PROCEDURE — 87186 SC STD MICRODIL/AGAR DIL: CPT | Performed by: ANESTHESIOLOGY

## 2017-03-03 PROCEDURE — 40000306 ZZH STATISTIC PRE PROC ASSESS II: Performed by: ORTHOPAEDIC SURGERY

## 2017-03-03 PROCEDURE — 36000056 ZZH SURGERY LEVEL 3 1ST 30 MIN: Performed by: ORTHOPAEDIC SURGERY

## 2017-03-03 PROCEDURE — 87076 CULTURE ANAEROBE IDENT EACH: CPT | Performed by: ORTHOPAEDIC SURGERY

## 2017-03-03 PROCEDURE — 25000128 H RX IP 250 OP 636: Performed by: ORTHOPAEDIC SURGERY

## 2017-03-03 PROCEDURE — 87186 SC STD MICRODIL/AGAR DIL: CPT | Performed by: ORTHOPAEDIC SURGERY

## 2017-03-03 PROCEDURE — 36000069 ZZH SURGERY LEVEL 5 EA 15 ADDTL MIN: Performed by: ORTHOPAEDIC SURGERY

## 2017-03-03 PROCEDURE — 87075 CULTR BACTERIA EXCEPT BLOOD: CPT | Performed by: ORTHOPAEDIC SURGERY

## 2017-03-03 PROCEDURE — 25000131 ZZH RX MED GY IP 250 OP 636 PS 637: Performed by: ORTHOPAEDIC SURGERY

## 2017-03-03 PROCEDURE — 25000125 ZZHC RX 250: Performed by: NURSE ANESTHETIST, CERTIFIED REGISTERED

## 2017-03-03 PROCEDURE — 87070 CULTURE OTHR SPECIMN AEROBIC: CPT | Performed by: ORTHOPAEDIC SURGERY

## 2017-03-03 PROCEDURE — 25000566 ZZH SEVOFLURANE, EA 15 MIN: Performed by: ORTHOPAEDIC SURGERY

## 2017-03-03 PROCEDURE — 87077 CULTURE AEROBIC IDENTIFY: CPT | Performed by: ORTHOPAEDIC SURGERY

## 2017-03-03 PROCEDURE — 25000128 H RX IP 250 OP 636: Performed by: NURSE ANESTHETIST, CERTIFIED REGISTERED

## 2017-03-03 PROCEDURE — 25000128 H RX IP 250 OP 636: Performed by: ANESTHESIOLOGY

## 2017-03-03 PROCEDURE — 12000007 ZZH R&B INTERMEDIATE

## 2017-03-03 PROCEDURE — 25800025 ZZH RX 258: Performed by: ANESTHESIOLOGY

## 2017-03-03 PROCEDURE — 25000125 ZZHC RX 250: Performed by: ANESTHESIOLOGY

## 2017-03-03 PROCEDURE — 71000012 ZZH RECOVERY PHASE 1 LEVEL 1 FIRST HR: Performed by: ORTHOPAEDIC SURGERY

## 2017-03-03 PROCEDURE — 0JB70ZZ EXCISION OF BACK SUBCUTANEOUS TISSUE AND FASCIA, OPEN APPROACH: ICD-10-PCS | Performed by: ORTHOPAEDIC SURGERY

## 2017-03-03 PROCEDURE — 37000008 ZZH ANESTHESIA TECHNICAL FEE, 1ST 30 MIN: Performed by: ORTHOPAEDIC SURGERY

## 2017-03-03 PROCEDURE — 36000058 ZZH SURGERY LEVEL 3 EA 15 ADDTL MIN: Performed by: ORTHOPAEDIC SURGERY

## 2017-03-03 PROCEDURE — 87081 CULTURE SCREEN ONLY: CPT | Performed by: ANESTHESIOLOGY

## 2017-03-03 PROCEDURE — 25000125 ZZHC RX 250: Performed by: ORTHOPAEDIC SURGERY

## 2017-03-03 PROCEDURE — 36000067 ZZH SURGERY LEVEL 5 1ST 30 MIN: Performed by: ORTHOPAEDIC SURGERY

## 2017-03-03 PROCEDURE — 00000146 ZZHCL STATISTIC GLUCOSE BY METER IP

## 2017-03-03 PROCEDURE — 27210794 ZZH OR GENERAL SUPPLY STERILE: Performed by: ORTHOPAEDIC SURGERY

## 2017-03-03 PROCEDURE — 71000013 ZZH RECOVERY PHASE 1 LEVEL 1 EA ADDTL HR: Performed by: ORTHOPAEDIC SURGERY

## 2017-03-03 PROCEDURE — 25000132 ZZH RX MED GY IP 250 OP 250 PS 637: Performed by: ORTHOPAEDIC SURGERY

## 2017-03-03 RX ORDER — MORPHINE SULFATE 4 MG/ML
4-6 INJECTION, SOLUTION INTRAMUSCULAR; INTRAVENOUS
Status: DISCONTINUED | OUTPATIENT
Start: 2017-03-03 | End: 2017-03-07 | Stop reason: HOSPADM

## 2017-03-03 RX ORDER — ONDANSETRON 2 MG/ML
INJECTION INTRAMUSCULAR; INTRAVENOUS PRN
Status: DISCONTINUED | OUTPATIENT
Start: 2017-03-03 | End: 2017-03-03

## 2017-03-03 RX ORDER — LABETALOL HYDROCHLORIDE 5 MG/ML
10 INJECTION, SOLUTION INTRAVENOUS
Status: DISCONTINUED | OUTPATIENT
Start: 2017-03-03 | End: 2017-03-03 | Stop reason: HOSPADM

## 2017-03-03 RX ORDER — NEOSTIGMINE METHYLSULFATE 1 MG/ML
VIAL (ML) INJECTION PRN
Status: DISCONTINUED | OUTPATIENT
Start: 2017-03-03 | End: 2017-03-03

## 2017-03-03 RX ORDER — LORAZEPAM 2 MG/ML
.5-1 INJECTION INTRAMUSCULAR EVERY 6 HOURS PRN
Status: DISCONTINUED | OUTPATIENT
Start: 2017-03-03 | End: 2017-03-07 | Stop reason: HOSPADM

## 2017-03-03 RX ORDER — GLYCOPYRROLATE 0.2 MG/ML
INJECTION, SOLUTION INTRAMUSCULAR; INTRAVENOUS PRN
Status: DISCONTINUED | OUTPATIENT
Start: 2017-03-03 | End: 2017-03-03

## 2017-03-03 RX ORDER — ALBUTEROL SULFATE 90 UG/1
2 AEROSOL, METERED RESPIRATORY (INHALATION) EVERY 6 HOURS PRN
Status: DISCONTINUED | OUTPATIENT
Start: 2017-03-03 | End: 2017-03-07 | Stop reason: HOSPADM

## 2017-03-03 RX ORDER — SENNOSIDES 8.6 MG
1 TABLET ORAL DAILY PRN
Status: DISCONTINUED | OUTPATIENT
Start: 2017-03-03 | End: 2017-03-07 | Stop reason: HOSPADM

## 2017-03-03 RX ORDER — EPHEDRINE SULFATE 50 MG/ML
INJECTION, SOLUTION INTRAMUSCULAR; INTRAVENOUS; SUBCUTANEOUS PRN
Status: DISCONTINUED | OUTPATIENT
Start: 2017-03-03 | End: 2017-03-03

## 2017-03-03 RX ORDER — SODIUM CHLORIDE, SODIUM LACTATE, POTASSIUM CHLORIDE, CALCIUM CHLORIDE 600; 310; 30; 20 MG/100ML; MG/100ML; MG/100ML; MG/100ML
INJECTION, SOLUTION INTRAVENOUS CONTINUOUS
Status: DISCONTINUED | OUTPATIENT
Start: 2017-03-03 | End: 2017-03-03 | Stop reason: HOSPADM

## 2017-03-03 RX ORDER — HYDROXYZINE HYDROCHLORIDE 25 MG/1
25 TABLET, FILM COATED ORAL EVERY 6 HOURS PRN
Status: DISCONTINUED | OUTPATIENT
Start: 2017-03-03 | End: 2017-03-03

## 2017-03-03 RX ORDER — LIDOCAINE 40 MG/G
CREAM TOPICAL
Status: DISCONTINUED | OUTPATIENT
Start: 2017-03-03 | End: 2017-03-03 | Stop reason: HOSPADM

## 2017-03-03 RX ORDER — ONDANSETRON 2 MG/ML
4 INJECTION INTRAMUSCULAR; INTRAVENOUS EVERY 30 MIN PRN
Status: DISCONTINUED | OUTPATIENT
Start: 2017-03-03 | End: 2017-03-03 | Stop reason: HOSPADM

## 2017-03-03 RX ORDER — OXYCODONE HYDROCHLORIDE 5 MG/1
5-10 TABLET ORAL
Status: DISCONTINUED | OUTPATIENT
Start: 2017-03-03 | End: 2017-03-05

## 2017-03-03 RX ORDER — NORTRIPTYLINE HCL 25 MG
50 CAPSULE ORAL AT BEDTIME
Status: DISCONTINUED | OUTPATIENT
Start: 2017-03-03 | End: 2017-03-07 | Stop reason: HOSPADM

## 2017-03-03 RX ORDER — DEXAMETHASONE SODIUM PHOSPHATE 4 MG/ML
INJECTION, SOLUTION INTRA-ARTICULAR; INTRALESIONAL; INTRAMUSCULAR; INTRAVENOUS; SOFT TISSUE PRN
Status: DISCONTINUED | OUTPATIENT
Start: 2017-03-03 | End: 2017-03-03

## 2017-03-03 RX ORDER — CEFTRIAXONE 1 G/1
1 INJECTION, POWDER, FOR SOLUTION INTRAMUSCULAR; INTRAVENOUS EVERY 24 HOURS
Status: DISCONTINUED | OUTPATIENT
Start: 2017-03-03 | End: 2017-03-05

## 2017-03-03 RX ORDER — LIDOCAINE 40 MG/G
CREAM TOPICAL
Status: DISCONTINUED | OUTPATIENT
Start: 2017-03-03 | End: 2017-03-07 | Stop reason: HOSPADM

## 2017-03-03 RX ORDER — NALOXONE HYDROCHLORIDE 0.4 MG/ML
.1-.4 INJECTION, SOLUTION INTRAMUSCULAR; INTRAVENOUS; SUBCUTANEOUS
Status: DISCONTINUED | OUTPATIENT
Start: 2017-03-03 | End: 2017-03-07 | Stop reason: HOSPADM

## 2017-03-03 RX ORDER — HYDROMORPHONE HYDROCHLORIDE 1 MG/ML
.3-.5 INJECTION, SOLUTION INTRAMUSCULAR; INTRAVENOUS; SUBCUTANEOUS EVERY 5 MIN PRN
Status: DISCONTINUED | OUTPATIENT
Start: 2017-03-03 | End: 2017-03-03 | Stop reason: HOSPADM

## 2017-03-03 RX ORDER — AMOXICILLIN 250 MG
1-2 CAPSULE ORAL 2 TIMES DAILY
Status: DISCONTINUED | OUTPATIENT
Start: 2017-03-03 | End: 2017-03-07 | Stop reason: HOSPADM

## 2017-03-03 RX ORDER — ACETAMINOPHEN 325 MG/1
975 TABLET ORAL EVERY 8 HOURS
Status: COMPLETED | OUTPATIENT
Start: 2017-03-03 | End: 2017-03-06

## 2017-03-03 RX ORDER — LORAZEPAM 1 MG/1
2 TABLET ORAL
Status: DISCONTINUED | OUTPATIENT
Start: 2017-03-03 | End: 2017-03-05

## 2017-03-03 RX ORDER — OXAZEPAM 10 MG
10 CAPSULE ORAL 2 TIMES DAILY
Status: DISCONTINUED | OUTPATIENT
Start: 2017-03-03 | End: 2017-03-07 | Stop reason: HOSPADM

## 2017-03-03 RX ORDER — LORAZEPAM 0.5 MG/1
.5-1 TABLET ORAL EVERY 6 HOURS PRN
Status: DISCONTINUED | OUTPATIENT
Start: 2017-03-03 | End: 2017-03-07 | Stop reason: HOSPADM

## 2017-03-03 RX ORDER — ONDANSETRON 4 MG/1
4 TABLET, ORALLY DISINTEGRATING ORAL EVERY 30 MIN PRN
Status: DISCONTINUED | OUTPATIENT
Start: 2017-03-03 | End: 2017-03-03 | Stop reason: HOSPADM

## 2017-03-03 RX ORDER — ACETAMINOPHEN 325 MG/1
650 TABLET ORAL EVERY 4 HOURS PRN
Status: DISCONTINUED | OUTPATIENT
Start: 2017-03-06 | End: 2017-03-07 | Stop reason: HOSPADM

## 2017-03-03 RX ORDER — NALOXONE HYDROCHLORIDE 0.4 MG/ML
.1-.4 INJECTION, SOLUTION INTRAMUSCULAR; INTRAVENOUS; SUBCUTANEOUS
Status: DISCONTINUED | OUTPATIENT
Start: 2017-03-03 | End: 2017-03-03

## 2017-03-03 RX ORDER — SENNOSIDES 8.6 MG
1 TABLET ORAL DAILY PRN
COMMUNITY

## 2017-03-03 RX ORDER — LIDOCAINE HYDROCHLORIDE 10 MG/ML
INJECTION, SOLUTION INFILTRATION; PERINEURAL PRN
Status: DISCONTINUED | OUTPATIENT
Start: 2017-03-03 | End: 2017-03-03

## 2017-03-03 RX ORDER — GLIMEPIRIDE 1 MG/1
2 TABLET ORAL 2 TIMES DAILY
Status: DISCONTINUED | OUTPATIENT
Start: 2017-03-03 | End: 2017-03-04

## 2017-03-03 RX ORDER — NICOTINE POLACRILEX 4 MG
15-30 LOZENGE BUCCAL
Status: DISCONTINUED | OUTPATIENT
Start: 2017-03-03 | End: 2017-03-07 | Stop reason: HOSPADM

## 2017-03-03 RX ORDER — GABAPENTIN 600 MG/1
1200 TABLET ORAL 2 TIMES DAILY
Status: DISCONTINUED | OUTPATIENT
Start: 2017-03-03 | End: 2017-03-07 | Stop reason: HOSPADM

## 2017-03-03 RX ORDER — FENTANYL CITRATE 50 UG/ML
INJECTION, SOLUTION INTRAMUSCULAR; INTRAVENOUS PRN
Status: DISCONTINUED | OUTPATIENT
Start: 2017-03-03 | End: 2017-03-03

## 2017-03-03 RX ORDER — DEXTROSE MONOHYDRATE 25 G/50ML
25-50 INJECTION, SOLUTION INTRAVENOUS
Status: DISCONTINUED | OUTPATIENT
Start: 2017-03-03 | End: 2017-03-07 | Stop reason: HOSPADM

## 2017-03-03 RX ORDER — HYDROXYZINE HYDROCHLORIDE 25 MG/1
25 TABLET, FILM COATED ORAL 3 TIMES DAILY PRN
Status: DISCONTINUED | OUTPATIENT
Start: 2017-03-03 | End: 2017-03-07 | Stop reason: HOSPADM

## 2017-03-03 RX ORDER — ONDANSETRON 4 MG/1
4 TABLET, ORALLY DISINTEGRATING ORAL EVERY 6 HOURS PRN
Status: DISCONTINUED | OUTPATIENT
Start: 2017-03-03 | End: 2017-03-07 | Stop reason: HOSPADM

## 2017-03-03 RX ORDER — LIRAGLUTIDE 6 MG/ML
1.2 INJECTION SUBCUTANEOUS DAILY
Status: DISCONTINUED | OUTPATIENT
Start: 2017-03-03 | End: 2017-03-07 | Stop reason: HOSPADM

## 2017-03-03 RX ORDER — METOPROLOL SUCCINATE 25 MG/1
25 TABLET, EXTENDED RELEASE ORAL DAILY
Status: DISCONTINUED | OUTPATIENT
Start: 2017-03-03 | End: 2017-03-07 | Stop reason: HOSPADM

## 2017-03-03 RX ORDER — SODIUM CHLORIDE AND POTASSIUM CHLORIDE 150; 450 MG/100ML; MG/100ML
INJECTION, SOLUTION INTRAVENOUS CONTINUOUS
Status: DISCONTINUED | OUTPATIENT
Start: 2017-03-03 | End: 2017-03-04

## 2017-03-03 RX ORDER — PROPOFOL 10 MG/ML
INJECTION, EMULSION INTRAVENOUS PRN
Status: DISCONTINUED | OUTPATIENT
Start: 2017-03-03 | End: 2017-03-03

## 2017-03-03 RX ORDER — ONDANSETRON 2 MG/ML
4 INJECTION INTRAMUSCULAR; INTRAVENOUS EVERY 6 HOURS PRN
Status: DISCONTINUED | OUTPATIENT
Start: 2017-03-03 | End: 2017-03-07 | Stop reason: HOSPADM

## 2017-03-03 RX ORDER — FENTANYL CITRATE 50 UG/ML
25-50 INJECTION, SOLUTION INTRAMUSCULAR; INTRAVENOUS
Status: DISCONTINUED | OUTPATIENT
Start: 2017-03-03 | End: 2017-03-03 | Stop reason: HOSPADM

## 2017-03-03 RX ORDER — FENTANYL CITRATE 50 UG/ML
100 INJECTION, SOLUTION INTRAMUSCULAR; INTRAVENOUS ONCE
Status: COMPLETED | OUTPATIENT
Start: 2017-03-03 | End: 2017-03-03

## 2017-03-03 RX ORDER — SIMVASTATIN 20 MG
20 TABLET ORAL DAILY
Status: DISCONTINUED | OUTPATIENT
Start: 2017-03-04 | End: 2017-03-07 | Stop reason: HOSPADM

## 2017-03-03 RX ADMIN — VASOPRESSIN 1 UNITS: 20 INJECTION INTRAMUSCULAR; SUBCUTANEOUS at 15:40

## 2017-03-03 RX ADMIN — GABAPENTIN 1200 MG: 600 TABLET, FILM COATED ORAL at 19:36

## 2017-03-03 RX ADMIN — VANCOMYCIN HYDROCHLORIDE 1 G: 1 INJECTION, POWDER, LYOPHILIZED, FOR SOLUTION INTRAVENOUS at 15:56

## 2017-03-03 RX ADMIN — ACETAMINOPHEN 975 MG: 325 TABLET, FILM COATED ORAL at 19:35

## 2017-03-03 RX ADMIN — METOPROLOL TARTRATE 5 MG: 5 INJECTION INTRAVENOUS at 15:01

## 2017-03-03 RX ADMIN — Medication 3 MG: at 15:53

## 2017-03-03 RX ADMIN — POTASSIUM CHLORIDE AND SODIUM CHLORIDE: 450; 150 INJECTION, SOLUTION INTRAVENOUS at 19:51

## 2017-03-03 RX ADMIN — LIRAGLUTIDE 1.2 MG: 6 INJECTION SUBCUTANEOUS at 21:13

## 2017-03-03 RX ADMIN — VASOPRESSIN 1 UNITS: 20 INJECTION INTRAMUSCULAR; SUBCUTANEOUS at 15:50

## 2017-03-03 RX ADMIN — TIZANIDINE 8 MG: 4 TABLET ORAL at 21:46

## 2017-03-03 RX ADMIN — PHENYLEPHRINE HYDROCHLORIDE 100 MCG: 10 INJECTION, SOLUTION INTRAMUSCULAR; INTRAVENOUS; SUBCUTANEOUS at 15:13

## 2017-03-03 RX ADMIN — SODIUM CHLORIDE, POTASSIUM CHLORIDE, SODIUM LACTATE AND CALCIUM CHLORIDE: 600; 310; 30; 20 INJECTION, SOLUTION INTRAVENOUS at 15:15

## 2017-03-03 RX ADMIN — VANCOMYCIN HYDROCHLORIDE 1250 MG: 10 INJECTION, POWDER, LYOPHILIZED, FOR SOLUTION INTRAVENOUS at 23:55

## 2017-03-03 RX ADMIN — ONDANSETRON 4 MG: 2 INJECTION INTRAMUSCULAR; INTRAVENOUS at 15:53

## 2017-03-03 RX ADMIN — METFORMIN HYDROCHLORIDE 1000 MG: 500 TABLET ORAL at 19:35

## 2017-03-03 RX ADMIN — GLIMEPIRIDE 2 MG: 1 TABLET ORAL at 19:35

## 2017-03-03 RX ADMIN — Medication 5 MG: at 15:29

## 2017-03-03 RX ADMIN — SODIUM CHLORIDE, POTASSIUM CHLORIDE, SODIUM LACTATE AND CALCIUM CHLORIDE: 600; 310; 30; 20 INJECTION, SOLUTION INTRAVENOUS at 15:41

## 2017-03-03 RX ADMIN — METOPROLOL SUCCINATE 25 MG: 25 TABLET, EXTENDED RELEASE ORAL at 19:35

## 2017-03-03 RX ADMIN — PHENYLEPHRINE HYDROCHLORIDE 100 MCG: 10 INJECTION, SOLUTION INTRAMUSCULAR; INTRAVENOUS; SUBCUTANEOUS at 15:08

## 2017-03-03 RX ADMIN — FENTANYL CITRATE 100 MCG: 50 INJECTION INTRAMUSCULAR; INTRAVENOUS at 14:42

## 2017-03-03 RX ADMIN — LIDOCAINE HYDROCHLORIDE 30 MG: 10 INJECTION, SOLUTION INFILTRATION; PERINEURAL at 14:55

## 2017-03-03 RX ADMIN — INSULIN ASPART 3 UNITS: 100 INJECTION, SOLUTION INTRAVENOUS; SUBCUTANEOUS at 22:27

## 2017-03-03 RX ADMIN — OXYCODONE HYDROCHLORIDE 10 MG: 5 TABLET ORAL at 21:45

## 2017-03-03 RX ADMIN — GLYCOPYRROLATE 0.4 MG: 0.2 INJECTION, SOLUTION INTRAMUSCULAR; INTRAVENOUS at 15:53

## 2017-03-03 RX ADMIN — SODIUM CHLORIDE, POTASSIUM CHLORIDE, SODIUM LACTATE AND CALCIUM CHLORIDE: 600; 310; 30; 20 INJECTION, SOLUTION INTRAVENOUS at 14:25

## 2017-03-03 RX ADMIN — FENTANYL CITRATE 150 MCG: 50 INJECTION, SOLUTION INTRAMUSCULAR; INTRAVENOUS at 14:55

## 2017-03-03 RX ADMIN — PROPOFOL 200 MG: 10 INJECTION, EMULSION INTRAVENOUS at 14:55

## 2017-03-03 RX ADMIN — PHENYLEPHRINE HYDROCHLORIDE 200 MCG: 10 INJECTION, SOLUTION INTRAMUSCULAR; INTRAVENOUS; SUBCUTANEOUS at 15:32

## 2017-03-03 RX ADMIN — FENTANYL CITRATE 50 MCG: 50 INJECTION INTRAMUSCULAR; INTRAVENOUS at 16:31

## 2017-03-03 RX ADMIN — Medication 5 MG: at 15:19

## 2017-03-03 RX ADMIN — PHENYLEPHRINE HYDROCHLORIDE 200 MCG: 10 INJECTION, SOLUTION INTRAMUSCULAR; INTRAVENOUS; SUBCUTANEOUS at 15:18

## 2017-03-03 RX ADMIN — MIDAZOLAM HYDROCHLORIDE 2 MG: 1 INJECTION, SOLUTION INTRAMUSCULAR; INTRAVENOUS at 14:48

## 2017-03-03 RX ADMIN — ROCURONIUM BROMIDE 40 MG: 10 INJECTION INTRAVENOUS at 14:55

## 2017-03-03 RX ADMIN — CEFTRIAXONE 1 G: 1 INJECTION, POWDER, FOR SOLUTION INTRAMUSCULAR; INTRAVENOUS at 19:36

## 2017-03-03 RX ADMIN — HYDROMORPHONE HYDROCHLORIDE 0.5 MG: 1 INJECTION, SOLUTION INTRAMUSCULAR; INTRAVENOUS; SUBCUTANEOUS at 17:50

## 2017-03-03 RX ADMIN — PHENYLEPHRINE HYDROCHLORIDE 100 MCG: 10 INJECTION, SOLUTION INTRAMUSCULAR; INTRAVENOUS; SUBCUTANEOUS at 15:26

## 2017-03-03 RX ADMIN — HYDROMORPHONE HYDROCHLORIDE 0.5 MG: 1 INJECTION, SOLUTION INTRAMUSCULAR; INTRAVENOUS; SUBCUTANEOUS at 16:45

## 2017-03-03 RX ADMIN — NORTRIPTYLINE HYDROCHLORIDE 50 MG: 25 CAPSULE ORAL at 21:45

## 2017-03-03 RX ADMIN — GLYCOPYRROLATE 0.2 MG: 0.2 INJECTION, SOLUTION INTRAMUSCULAR; INTRAVENOUS at 14:55

## 2017-03-03 RX ADMIN — DEXAMETHASONE SODIUM PHOSPHATE 4 MG: 4 INJECTION, SOLUTION INTRAMUSCULAR; INTRAVENOUS at 14:55

## 2017-03-03 RX ADMIN — ROCURONIUM BROMIDE 10 MG: 10 INJECTION INTRAVENOUS at 15:29

## 2017-03-03 RX ADMIN — LORAZEPAM 2 MG: 1 TABLET ORAL at 23:54

## 2017-03-03 RX ADMIN — Medication 5 MG: at 15:32

## 2017-03-03 RX ADMIN — MORPHINE SULFATE 4 MG: 4 INJECTION, SOLUTION INTRAMUSCULAR; INTRAVENOUS at 19:14

## 2017-03-03 RX ADMIN — PHENYLEPHRINE HYDROCHLORIDE 200 MCG: 10 INJECTION, SOLUTION INTRAMUSCULAR; INTRAVENOUS; SUBCUTANEOUS at 15:29

## 2017-03-03 NOTE — IP AVS SNAPSHOT
"    [facility]: [phone]                                              INTERAGENCY TRANSFER FORM - PHYSICIAN ORDERS   3/3/2017                    Hospital Admission Date: 3/3/2017  ELI ARAUZ   : 1965  Sex: Female        Attending Provider: Juan Gray MD     Allergies:  Tetanus Toxoid, Wellbutrin [Bupropion], Adhesive Tape    Infection:  MRSA-Contact Isolation   Service:  SPINAL SURGE    Ht:  1.664 m (5' 5.51\")   Wt:  99.8 kg (220 lb)   Admission Wt:  99.8 kg (220 lb)    BMI:  36.04 kg/m 2   BSA:  2.15 m 2            Patient PCP Information     Provider PCP Type    Luca Schuster [facility]      ED Clinical Impression     Diagnosis Description Comment Added By Time Added    Postoperative surgical complication involving musculoskeletal system associated with musculoskeletal procedure, unspecified complication [M96.89] Postoperative surgical complication involving musculoskeletal system associated with musculoskeletal procedure, unspecified complication [M96.89]  Stef Layne MD 3/4/2017  6:50 PM    Lumbar spondylolysis [M43.06] Lumbar spondylolysis [M43.06]  Gurpreet Holloway, APRN CNP 3/7/2017  7:59 AM    Wound infection [T14.8, L08.9] Wound infection [T14.8, L08.9]  Gurpreet Holloway APRN CNP 3/7/2017  8:19 AM    Status post cervical spinal fusion [Z98.1] Status post cervical spinal fusion [Z98.1]  Elisa Navarro MD 3/7/2017  1:50 PM    Methicillin-resistant Staphylococcus aureus infection of postoperative wound, initial encounter [T81.4XXA, B95.62] Methicillin-resistant Staphylococcus aureus infection of postoperative wound, initial encounter [T81.4XXA, B95.62]  Elisa Navarro MD 3/7/2017  1:50 PM      Hospital Problems as of 3/7/2017              Priority Class Noted POA    Wound infection Medium  3/3/2017 Yes    Post-operative complication High  3/4/2017 Yes      Non-Hospital Problems as of 3/7/2017              Priority Class Noted    Lumbar " spondylolysis   5/22/2014    Failure of fusion (joint)(spinal) (H) Medium  2/9/2017      Code Status History     Date Active Date Inactive Code Status Order ID Comments User Context    2/9/2017  8:45 PM 2/11/2017  3:42 PM Full Code [id number]  Roxie Qureshi MD Inpatient    5/24/2014  9:39 AM 2/9/2017  8:45 PM Full Code [id number]  Roxie Qureshi MD Outpatient    5/22/2014  4:57 PM 5/24/2014  9:39 AM Full Code [id number]  Roxie Qureshi MD Inpatient         Medication Review      START taking        Dose / Directions Comments    acetaminophen 325 MG tablet   Commonly known as:  TYLENOL   Used for:  Status post cervical spinal fusion, Methicillin-resistant Staphylococcus aureus infection of postoperative wound, initial encounter        Dose:  975 mg   Take 3 tablets (975 mg) by mouth every 8 hours   Quantity:  300 tablet   Refills:  1        oxyCODONE 10 MG IR tablet   Commonly known as:  ROXICODONE   Used for:  Status post cervical spinal fusion, Methicillin-resistant Staphylococcus aureus infection of postoperative wound, initial encounter        Dose:  15 mg   Take 1.5 tablets (15 mg) by mouth every 4 hours as needed for moderate to severe pain   Quantity:  180 tablet   Refills:  0        rifampin 300 MG capsule   Commonly known as:  RIFADIN   Indication:  Bone and Joint Infection   Used for:  Methicillin-resistant Staphylococcus aureus infection of postoperative wound, initial encounter, Status post cervical spinal fusion        Dose:  300 mg   Take 1 capsule (300 mg) by mouth every 12 hours   Quantity:  120 capsule   Refills:  3        vancomycin 100 mg/mL injection   Commonly known as:  VANCOCIN        Dose:  1000 mg   Inject 1 g (1,000 mg) into the vein every 12 hours ESR,CRP,CBC with differential weekly, creatinine, vancomycin trough,  twice weekly while on this medication to be faxed to Dr. Castellanos office at [phone].   Quantity:  600 mL   Refills:  1          CONTINUE these medications which have NOT  CHANGED        Dose / Directions Comments    albuterol 108 (90 BASE) MCG/ACT Inhaler   Commonly known as:  PROAIR HFA/PROVENTIL HFA/VENTOLIN HFA        Dose:  2 puff   Inhale 2 puffs into the lungs every 6 hours as needed   Refills:  0        COLACE PO        Dose:  50 mg   Take 50 mg by mouth daily   Refills:  0        GABAPENTIN PO        Dose:  1200 mg   Take 1,200 mg by mouth 2 times daily   Refills:  0        glimepiride 2 MG tablet   Commonly known as:  AMARYL        Dose:  2 mg   Take 2 mg by mouth 2 times daily   Refills:  0        liraglutide 18 MG/3ML soln   Commonly known as:  VICTOZA        Dose:  1.2 mg   Inject 1.2 mg Subcutaneous daily   Refills:  0        LORAZEPAM PO        Dose:  2 mg   Take 2 mg by mouth nightly as needed for anxiety   Refills:  0        METFORMIN HCL PO        Dose:  1000 mg   Take 1,000 mg by mouth 2 times daily (with meals)   Refills:  0        METOPROLOL SUCCINATE ER PO        Dose:  25 mg   Take 25 mg by mouth daily   Refills:  0        NORTRIPTYLINE HCL PO        Dose:  50 mg   Take 50 mg by mouth At Bedtime   Refills:  0        OMEPRAZOLE PO        Dose:  20 mg   Take 20 mg by mouth 2 times daily (before meals)   Refills:  0        OXAZEPAM PO        Dose:  10 mg   Take 10 mg by mouth 2 times daily   Refills:  0        PROZAC PO        Dose:  20 mg   Take 20 mg by mouth daily   Refills:  0        sennosides 8.6 MG tablet   Commonly known as:  SENOKOT        Dose:  1 tablet   Take 1 tablet by mouth daily as needed for constipation   Refills:  0        SIMVASTATIN PO        Dose:  20 mg   Take 20 mg by mouth daily   Refills:  0        * TIZANIDINE HCL PO        Dose:  4 mg   Take 4 mg by mouth every morning   Refills:  0        * TIZANIDINE HCL PO        Dose:  8 mg   Take 8 mg by mouth At Bedtime   Refills:  0        * Notice:  This list has 2 medication(s) that are the same as other medications prescribed for you. Read the directions carefully, and ask your doctor or other  care provider to review them with you.      STOP taking     HYDROcodone-acetaminophen 5-325 MG per tablet   Commonly known as:  NORCO           HYDROXYZINE HCL PO                   Summary of Visit     Reason for your hospital stay       You were admitted for treatment of a wound infection of a recent cervical spinal decompression and fusion  The area was debrided surgically and the cultures grew staph aureus (MRSA)  A wound vac has been placed to promote healing  And you will need to have daily IV vancomycin for at least one month to attempt to irradiate the infection  An oral medication rifampin has also been added to take twice a day for improved antibiotic coverage  You have had a lot of discomfort from the inflammation and the debridement  And you may continue to take oxycodone 10 to 15 mg every 4 to 6 hours as needed for pain control  Be careful not to get constipated take senokot twice a day and miralax daily  Continue to take your prior medications to control your diabetes and your peripheral neuropathy             After Care     Activity       Your activity upon discharge  As tolerated and as before       Diet       Follow this diet upon discharge: moderate consistent diabetic diet       Discharge Instructions                 Referrals     Home Care Referral       _____________________________________________________________________    Your provider has referred you to: [facility]: [facility] [phone]   [email or web address]    Extended Emergency Contact Information  Primary Emergency Contact: Anahi Jackson   [facility]  Home Phone: [phone]  Work Phone: none  Mobile Phone: [phone]  Relation: Significant other    Patient Anticipated Discharge Date: 3/7/2017   RN, PT, HHA to begin 24 - 48 hours after discharge.  PLEASE EVALUATE AND TREAT (Evaluation timeline is 24 - 48 hrs. Please call if there is need for a variance to this  timeline).    REASON FOR REFERRAL: IV Treatment or Therapy (call [phone] when placing order): PICC line - IV antibiotics  and Wound Care - Specialty Treatment Orders: Wound vac posterior cervical, 75 mmHg continuous suction    ADDITIONAL SERVICES NEEDED: N/A    OTHER PERTINENT INFORMATION: Patient was last seen by provider on 3/7/2017 for wound infection status post posterior cervical fusion on 2/9/2017.    Current Outpatient Prescriptions:  HYDROcodone-acetaminophen (NORCO) 5-325 MG per tablet, Take 1-2 tablets by mouth every 6 hours as needed for moderate to severe pain, Disp: 30 tablet, Rfl: 0      Patient Active Problem List:     Lumbar spondylolysis     Failure of fusion (joint)(spinal) (H)     Wound infection     Post-operative complication      Documentation of Face to Face and Certification for Home Health Services    I certify that patient, Jessica Rucker is under my care and that I, or a Nurse Practitioner or Physician's Assistant working with me, had a face-to-face encounter that meets the physician face-to-face encounter requirements with this patient on: 3/7/2017.    This encounter with the patient was in whole, or in part, for the following medical condition, which is the primary reason for Home Health Care: post op surgical wound infection requiring wound vac and extended IV antibiotics.    I certify that, based on my findings, the following services are medically necessary Home Health Services: Nursing    My clinical findings support the need for the above services because: Nurse is needed: For complex aftercare of surgical procedures because the patient needs instruction and cannot perform care on their own due to: location of surgical wound on posterior cervical area requiring wound vac. and To provide caregiver training to assist with: IV antibiotic administration via PICC line.    Further, I certify that my clinical findings support that this patient is homebound (i.e. absences from home  require considerable and taxing effort and are for medical reasons or [employer] services or infrequently or of short duration when for other reasons) because: Requires assistance of another person or specialized equipment to access medical services because patient:  Requires MWF wound vac dressing changes on posterior cervical area.    Based on the above findings, I certify that this patient is confined to the home and needs intermittent skilled nursing care, physical therapy and/or speech therapy.  The patient is under my care, and I have initiated the establishment of the plan of care.  This patient will be followed by a physician who will periodically review the plan of care.    Physician/Provider to provide follow up care: Luca Schuster    [facility] certified Physician at time of discharge: Dr. Juan Gray MD    Please be aware that coverage of these services is subject to the terms and limitations of your health insurance plan.  Call member services at your health plan with any benefit or coverage questions.             Follow-Up Appointment Instructions     Future Labs/Procedures    Follow-up and recommended labs and tests      Comments:    Follow up with infectious disease as arranged in one month   And follow up with spinal surgery as arranged      Follow-Up Appointment Instructions     Follow-up and recommended labs and tests        Follow up with infectious disease as arranged in one month   And follow up with spinal surgery as arranged             Statement of Approval     Ordered          03/07/17 1425  I have reviewed and agree with all the recommendations and orders detailed in this document.  EFFECTIVE NOW     Approved and electronically signed by:  Elisa Navarro MD           03/07/17 0916  I have reviewed and agree with all the recommendations and orders detailed in this document.     Approved and electronically signed by:  Pankaj Castellanos MD

## 2017-03-03 NOTE — ANESTHESIA CARE TRANSFER NOTE
Patient: Jessica Rucker    Procedure(s):  Incision and drainage of cervical spinal wound infection with wound vac placement - Wound Class: I-Clean    Diagnosis: Deep posterior wound infection  Diagnosis Additional Information: No value filed.    Anesthesia Type:   General, ETT     Note:  Airway :Face Mask  Patient transferred to:PACU  Comments: vss      Vitals: (Last set prior to Anesthesia Care Transfer)    CRNA VITALS  3/3/2017 1533 - 3/3/2017 1614      3/3/2017             Pulse: 102    SpO2: 93 %    Resp Rate (observed): 14                Electronically Signed By: MARY Corcoran CRNA  March 3, 2017  4:14 PM

## 2017-03-03 NOTE — ANESTHESIA PREPROCEDURE EVALUATION
Anesthesia Evaluation     . Pt has had prior anesthetic. Type: General    History of anesthetic complications  - PONV    ROS/MED HX    ENT/Pulmonary:     (+)Mild Persistent asthma , . .    Neurologic:  - neg neurologic ROS     Cardiovascular:     (+) hypertension----. : . . . :. .       METS/Exercise Tolerance:     Hematologic: Comments: Lab Test        02/11/17     02/10/17     05/23/14     05/21/14                       0609          0615          0655          1755          WBC          12.6*        11.4*        15.2*        10.7          HGB          10.2*        11.8         11.2*        13.0          MCV          84           80           87           85            PLT          191          261          189          261           INR           --           --           --          1.01           Lab Test        02/10/17     02/10/17     05/24/14     05/23/14     05/21/14                       1230          0615          0540          0655          1755          NA            --          134           --          139          145*          POTASSIUM    4.4          5.4*          --          4.3          3.8           CHLORIDE      --          99            --          103          101           CO2           --          26            --          21           27            BUN           --          17            --          15           9             CR            --          0.86          --          0.85         0.76          ANIONGAP      --          9             --          15           17            СЕРГЕЙ           --          8.8           --          8.4*         9.2           GLC           --          208*         211*         263*         96                    Musculoskeletal:         GI/Hepatic:     (+) GERD Asymptomatic on medication,       Renal/Genitourinary:  - ROS Renal section negative       Endo:     (+) type II DM Obesity, .      Psychiatric:  - neg psychiatric ROS       Infectious Disease:    (+) Other Infectious Disease infection cervical fusion components      Malignancy:         Other:    - neg other ROS           Physical Exam  Normal systems: cardiovascular and pulmonary    Airway   Mallampati: II  TM distance: >3 FB  Neck ROM: full    Dental   (+) chipped and missing    Cardiovascular       Pulmonary                     Anesthesia Plan      History & Physical Review  History and physical reviewed and following examination; no interval change.    ASA Status:  3 .    NPO Status:  > 8 hours    Plan for General and ETT with Intravenous induction. Maintenance will be Balanced.    PONV prophylaxis:  Ondansetron (or other 5HT-3) and Dexamethasone or Solumedrol       Postoperative Care  Postoperative pain management:  Oral pain medications and IV analgesics.      Consents  Anesthetic plan, risks, benefits and alternatives discussed with:  Patient..                          .

## 2017-03-03 NOTE — IP AVS SNAPSHOT
[facility]    [address]    [facility] [address]    Phone:  [phone]    Fax:  [phone]                                       After Visit Summary   3/3/2017    Jessica Rucker    MRN: [id number]           After Visit Summary Signature Page     I have received my discharge instructions, and my questions have been answered. I have discussed any challenges I see with this plan with the nurse or doctor.    ..........................................................................................................................................  Patient/Patient Representative Signature      ..........................................................................................................................................  Patient Representative Print Name and Relationship to Patient    ..................................................               ................................................  Date                                            Time    ..........................................................................................................................................  Reviewed by Signature/Title    ...................................................              ..............................................  Date                                                            Time

## 2017-03-03 NOTE — BRIEF OP NOTE
[facility] Brief Operative Note    Pre-operative diagnosis: Deep posterior wound infection   Post-operative diagnosis deep wound infection posterior cervical spine ;  Seroma and purulent necrotic material sent to lab for aerobic and anerobic culture   Procedure: Procedure(s):  Incision and drainage of cervical spinal wound infection with wound vac placement - Wound Class: I-Clean   Surgeon(s): Surgeon(s) and Role:     * Juan Gray MD - Primary   Estimated blood loss: 30 mL    Specimens:   ID Type Source Tests Collected by Time Destination   1 : Posterior Cervical Wound  Wound Back ANAEROBIC BACTERIAL CULTURE, GRAM STAIN, WOUND CULTURE AEROBIC BACTERIAL Juan Gray MD 3/3/2017  3:10 PM    2 : Posterior Cervical Wound #2 Wound Back ANAEROBIC BACTERIAL CULTURE, GRAM STAIN, WOUND CULTURE AEROBIC BACTERIAL Juan Gray MD 3/3/2017  3:25 PM       Findings: See above

## 2017-03-03 NOTE — IP AVS SNAPSHOT
MRN:[id number]                      After Visit Summary   3/3/2017    Jessica Rucker    MRN: [id number]           Thank you!     Thank you for choosing [facility] for your care. Our goal is always to provide you with excellent care. Hearing back from our patients is one way we can continue to improve our services. Please take a few minutes to complete the written survey that you may receive in the mail after you visit. If you would like to speak to someone directly about your visit please contact Patient Relations at [phone]. Thank you!          Patient Information     Date Of Birth          1965        About your hospital stay     You were admitted on:  March 3, 2017 You last received care in the:  [facility]    You were discharged on:  March 7, 2017        Reason for your hospital stay       You were admitted for treatment of a wound infection of a recent cervical spinal decompression and fusion  The area was debrided surgically and the cultures grew staph aureus (MRSA)  A wound vac has been placed to promote healing  And you will need to have daily IV vancomycin for at least one month to attempt to irradiate the infection  An oral medication rifampin has also been added to take twice a day for improved antibiotic coverage  You have had a lot of discomfort from the inflammation and the debridement  And you may continue to take oxycodone 10 to 15 mg every 4 to 6 hours as needed for pain control  Be careful not to get constipated take senokot twice a day and miralax daily  Continue to take your prior medications to control your diabetes and your peripheral neuropathy                  Who to Call     For medical emergencies, please call 911.  For non-urgent questions about your medical care, please call your primary care provider or clinic, [phone]  For questions related to your surgery, please call your surgery clinic        Attending Provider      Provider Specialty    [facility] Gray MD Orthopedics    Juan Gray MD Orthoped[facility]       Primary Care Provider Office Phone # Fax #    Luca Schuster [phone]       [address] [address]        After Care Instructions     Activity       Your activity upon discharge  As tolerated and as before            Diet       Follow this diet upon discharge: moderate consistent diabetic diet            Discharge Instructions                 Follow-up Appointments     Follow-up and recommended labs and tests        Follow up with infectious disease as arranged in one month   And follow up with spinal surgery as arranged                  Additional Services     Home Care Referral       _____________________________________________________________________    Your provider has referred you to: [facility]: [facility] [phone]   [email or web address]    Extended Emergency Contact Information  Primary Emergency Contact: Anahi Jackson   [facility]  Home Phone: [phone]  Work Phone: none  Mobile Phone: [phone]  Relation: Significant other    Patient Anticipated Discharge Date: 3/7/2017   RN, PT, HHA to begin 24 - 48 hours after discharge.  PLEASE EVALUATE AND TREAT (Evaluation timeline is 24 - 48 hrs. Please call if there is need for a variance to this timeline).    REASON FOR REFERRAL: IV Treatment or Therapy (call [phone] when placing order): PICC line - IV antibiotics  and Wound Care - Specialty Treatment Orders: Wound vac posterior cervical, 75 mmHg continuous suction    ADDITIONAL SERVICES NEEDED: N/A    OTHER PERTINENT INFORMATION: Patient was last seen by provider on 3/7/2017 for wound infection status post posterior cervical fusion on 2/9/2017.    Current Outpatient Prescriptions:  HYDROcodone-acetaminophen (NORCO) 5-325 MG per tablet, Take 1-2 tablets by mouth every 6 hours as needed  for moderate to severe pain, Disp: 30 tablet, Rfl: 0      Patient Active Problem List:     Lumbar spondylolysis     Failure of fusion (joint)(spinal) (H)     Wound infection     Post-operative complication      Documentation of Face to Face and Certification for Home Health Services    I certify that patient, Jessica Rucker is under my care and that I, or a Nurse Practitioner or Physician's Assistant working with me, had a face-to-face encounter that meets the physician face-to-face encounter requirements with this patient on: 3/7/2017.    This encounter with the patient was in whole, or in part, for the following medical condition, which is the primary reason for Home Health Care: post op surgical wound infection requiring wound vac and extended IV antibiotics.    I certify that, based on my findings, the following services are medically necessary Home Health Services: Nursing    My clinical findings support the need for the above services because: Nurse is needed: For complex aftercare of surgical procedures because the patient needs instruction and cannot perform care on their own due to: location of surgical wound on posterior cervical area requiring wound vac. and To provide caregiver training to assist with: IV antibiotic administration via PICC line.    Further, I certify that my clinical findings support that this patient is homebound (i.e. absences from home require considerable and taxing effort and are for medical reasons or [employer] services or infrequently or of short duration when for other reasons) because: Requires assistance of another person or specialized equipment to access medical services because patient:  Requires MWF wound vac dressing changes on posterior cervical area.    Based on the above findings, I certify that this patient is confined to the home and needs intermittent skilled nursing care, physical therapy and/or speech therapy.  The patient is under my care, and I have initiated  "the establishment of the plan of care.  This patient will be followed by a physician who will periodically review the plan of care.    Physician/Provider to provide follow up care: Luca Schuster    Responsible PECOS certified Physician at time of discharge: Dr. Juan Gray MD    Please be aware that coverage of these services is subject to the terms and limitations of your health insurance plan.  Call member services at your health plan with any benefit or coverage questions.                  Pending Results     Date and Time Order Name Status Description    3/4/2017 1849 Blood culture Preliminary     3/3/2017 1526 Anaerobic bacterial culture Preliminary     3/3/2017 1524 Anaerobic bacterial culture Preliminary             Statement of Approval     Ordered          03/07/17 1425  I have reviewed and agree with all the recommendations and orders detailed in this document.  EFFECTIVE NOW     Approved and electronically signed by:  Elisa Navarro MD           03/07/17 3296  I have reviewed and agree with all the recommendations and orders detailed in this document.     Approved and electronically signed by:  Pankaj Castellanos MD             Admission Information     Date & Time Provider Department Dept. Phone    3/3/2017 Juan Gray MD [facility] [phone]      Your Vitals Were     Blood Pressure Pulse Temperature Respirations Height Weight    130/88 (BP Location: Left arm) 97 98.1  F (36.7  C) (Oral) 16 1.664 m (5' 5.51\") 99.8 kg (220 lb)    Pulse Oximetry BMI (Body Mass Index)                97% 36.04 kg/m2          MyChart Information     [employer] lets you send messages to your doctor, view your test results, renew your prescriptions, schedule appointments and more. To sign up, go to [email or web address] . Click on \"Log in\" on the left side of the screen, which will take you to the Welcome page. Then click on \"Sign up Now\" on the right side of the page.     You will be asked to " enter the access code listed below, as well as some personal information. Please follow the directions to create your username and password.     Your access code is: [email or web address]  Expires: 2017  8:43 AM     Your access code will  in 90 days. If you need help or a new code, please call your [facility] clinic or [phone].        Care EveryWhere ID     This is your Care EveryWhere ID. This could be used by other organizations to access your [facility] medical records  [id number]           Review of your medicines      START taking        Dose / Directions    acetaminophen 325 MG tablet   Commonly known as:  TYLENOL   Used for:  Status post cervical spinal fusion, Methicillin-resistant Staphylococcus aureus infection of postoperative wound, initial encounter        Dose:  975 mg   Take 3 tablets (975 mg) by mouth every 8 hours   Quantity:  300 tablet   Refills:  1       oxyCODONE 10 MG IR tablet   Commonly known as:  ROXICODONE   Used for:  Status post cervical spinal fusion, Methicillin-resistant Staphylococcus aureus infection of postoperative wound, initial encounter        Dose:  15 mg   Take 1.5 tablets (15 mg) by mouth every 4 hours as needed for moderate to severe pain   Quantity:  180 tablet   Refills:  0       rifampin 300 MG capsule   Commonly known as:  RIFADIN   Indication:  Bone and Joint Infection   Used for:  Methicillin-resistant Staphylococcus aureus infection of postoperative wound, initial encounter, Status post cervical spinal fusion        Dose:  300 mg   Take 1 capsule (300 mg) by mouth every 12 hours   Quantity:  120 capsule   Refills:  3       vancomycin 100 mg/mL injection   Commonly known as:  VANCOCIN        Dose:  1000 mg   Inject 1 g (1,000 mg) into the vein every 12 hours ESR,CRP,CBC with differential weekly, creatinine, vancomycin trough,  twice weekly while on this medication to be faxed to Dr. Castellanos office at [phone].   Quantity:  600 mL   Refills:  1         CONTINUE  these medicines which have NOT CHANGED        Dose / Directions    albuterol 108 (90 BASE) MCG/ACT Inhaler   Commonly known as:  PROAIR HFA/PROVENTIL HFA/VENTOLIN HFA   Notes to Patient:  Resume per home schedule        Dose:  2 puff   Inhale 2 puffs into the lungs every 6 hours as needed   Refills:  0       COLACE PO   Notes to Patient:  Resume per home schedule        Dose:  50 mg   Take 50 mg by mouth daily   Refills:  0       GABAPENTIN PO        Dose:  1200 mg   Take 1,200 mg by mouth 2 times daily   Refills:  0       glimepiride 2 MG tablet   Commonly known as:  AMARYL   Notes to Patient:  Resume per home schedule        Dose:  2 mg   Take 2 mg by mouth 2 times daily   Refills:  0       liraglutide 18 MG/3ML soln   Commonly known as:  VICTOZA   Notes to Patient:  Resume per home schedule        Dose:  1.2 mg   Inject 1.2 mg Subcutaneous daily   Refills:  0       LORAZEPAM PO   Notes to Patient:  Resume per home schedule        Dose:  2 mg   Take 2 mg by mouth nightly as needed for anxiety   Refills:  0       METFORMIN HCL PO   Notes to Patient:  Resume per home schedule        Dose:  1000 mg   Take 1,000 mg by mouth 2 times daily (with meals)   Refills:  0       METOPROLOL SUCCINATE ER PO   Notes to Patient:  Resume per home schedule        Dose:  25 mg   Take 25 mg by mouth daily   Refills:  0       NORTRIPTYLINE HCL PO   Notes to Patient:  Resume per home schedule        Dose:  50 mg   Take 50 mg by mouth At Bedtime   Refills:  0       OMEPRAZOLE PO   Notes to Patient:  Resume per home schedule        Dose:  20 mg   Take 20 mg by mouth 2 times daily (before meals)   Refills:  0       OXAZEPAM PO   Notes to Patient:  Resume per home schedule        Dose:  10 mg   Take 10 mg by mouth 2 times daily   Refills:  0       PROZAC PO   Notes to Patient:  Resume per home schedule        Dose:  20 mg   Take 20 mg by mouth daily   Refills:  0       sennosides 8.6 MG tablet   Commonly known as:  SENOKOT        Dose:  1  tablet   Take 1 tablet by mouth daily as needed for constipation   Refills:  0       SIMVASTATIN PO   Notes to Patient:  Resume per home schedule        Dose:  20 mg   Take 20 mg by mouth daily   Refills:  0       * TIZANIDINE HCL PO   Notes to Patient:  Resume per home schedule        Dose:  4 mg   Take 4 mg by mouth every morning   Refills:  0       * TIZANIDINE HCL PO   Notes to Patient:  Resume per home schedule        Dose:  8 mg   Take 8 mg by mouth At Bedtime   Refills:  0       * Notice:  This list has 2 medication(s) that are the same as other medications prescribed for you. Read the directions carefully, and ask your doctor or other care provider to review them with you.      STOP taking     HYDROcodone-acetaminophen 5-325 MG per tablet   Commonly known as:  NORCO           HYDROXYZINE HCL PO                Where to get your medicines      These medications were sent to [place], [place] - [address] [facility]  [address] [facility] [address]     Phone:  [phone]     acetaminophen 325 MG tablet    rifampin 300 MG capsule         Some of these will need a paper prescription and others can be bought over the counter. Ask your nurse if you have questions.     Bring a paper prescription for each of these medications     oxyCODONE 10 MG IR tablet         Information about where to get these medications is not yet available     ! Ask your nurse or doctor about these medications     vancomycin 100 mg/mL injection                Protect others around you: Learn how to safely use, store and throw away your medicines at www.disposemymeds.org.             Medication List: This is a list of all your medications and when to take them. Check marks below indicate your daily home schedule. Keep this list as a reference.      Medications           Morning Afternoon Evening Bedtime As Needed    acetaminophen 325 MG tablet   Commonly known as:  TYLENOL   Take 3 tablets (975 mg)  by mouth every 8 hours   Last time this was given:  975 mg on 3/6/2017 11:41 AM   Next Dose Due:  After 740 pm                                albuterol 108 (90 BASE) MCG/ACT Inhaler   Commonly known as:  PROAIR HFA/PROVENTIL HFA/VENTOLIN HFA   Inhale 2 puffs into the lungs every 6 hours as needed   Notes to Patient:  Resume per home schedule                                COLACE PO   Take 50 mg by mouth daily   Notes to Patient:  Resume per home schedule                                GABAPENTIN PO   Take 1,200 mg by mouth 2 times daily   Last time this was given:  1,200 mg on 3/7/2017  8:13 AM   Next Dose Due:  Tonight                                        glimepiride 2 MG tablet   Commonly known as:  AMARYL   Take 2 mg by mouth 2 times daily   Last time this was given:  2 mg on 3/4/2017  8:15 AM   Notes to Patient:  Resume per home schedule                                liraglutide 18 MG/3ML soln   Commonly known as:  VICTOZA   Inject 1.2 mg Subcutaneous daily   Last time this was given:  1.2 mg on 3/7/2017  8:14 AM   Notes to Patient:  Resume per home schedule                                LORAZEPAM PO   Take 2 mg by mouth nightly as needed for anxiety   Last time this was given:  2 mg on 3/6/2017  9:16 PM   Notes to Patient:  Resume per home schedule                                METFORMIN HCL PO   Take 1,000 mg by mouth 2 times daily (with meals)   Last time this was given:  1,000 mg on 3/7/2017  8:13 AM   Next Dose Due:  Tonight     Notes to Patient:  Resume per home schedule                                      METOPROLOL SUCCINATE ER PO   Take 25 mg by mouth daily   Last time this was given:  25 mg on 3/7/2017  8:13 AM   Notes to Patient:  Resume per home schedule                                   NORTRIPTYLINE HCL PO   Take 50 mg by mouth At Bedtime   Last time this was given:  50 mg on 3/6/2017  9:16 PM   Next Dose Due:  Tonight     Notes to Patient:  Resume per home schedule                                    OMEPRAZOLE PO   Take 20 mg by mouth 2 times daily (before meals)   Last time this was given:  20 mg on 3/7/2017  5:49 AM   Next Dose Due:  Tonight     Notes to Patient:  Resume per home schedule                                      OXAZEPAM PO   Take 10 mg by mouth 2 times daily   Notes to Patient:  Resume per home schedule                                oxyCODONE 10 MG IR tablet   Commonly known as:  ROXICODONE   Take 1.5 tablets (15 mg) by mouth every 4 hours as needed for moderate to severe pain   Last time this was given:  15 mg on 3/7/2017 12:35 PM   Next Dose Due:  After 430 pm                                PROZAC PO   Take 20 mg by mouth daily   Last time this was given:  20 mg on 3/7/2017  8:13 AM   Notes to Patient:  Resume per home schedule                                   rifampin 300 MG capsule   Commonly known as:  RIFADIN   Take 1 capsule (300 mg) by mouth every 12 hours   Last time this was given:  300 mg on 3/7/2017 11:46 AM   Next Dose Due:  1145 pm                                sennosides 8.6 MG tablet   Commonly known as:  SENOKOT   Take 1 tablet by mouth daily as needed for constipation                                SIMVASTATIN PO   Take 20 mg by mouth daily   Last time this was given:  20 mg on 3/7/2017  8:14 AM   Notes to Patient:  Resume per home schedule                                * TIZANIDINE HCL PO   Take 4 mg by mouth every morning   Last time this was given:  4 mg on 3/7/2017  8:14 AM   Notes to Patient:  Resume per home schedule                                * TIZANIDINE HCL PO   Take 8 mg by mouth At Bedtime   Last time this was given:  4 mg on 3/7/2017  8:14 AM   Notes to Patient:  Resume per home schedule                                vancomycin 100 mg/mL injection   Commonly known as:  VANCOCIN   Inject 1 g (1,000 mg) into the vein every 12 hours ESR,CRP,CBC with differential weekly, creatinine, vancomycin trough,  twice weekly while on this medication to be faxed to  Dr. Castellanos office at [phone].   Last time this was given:  1,250 mg on 3/4/2017  3:55 PM                                * Notice:  This list has 2 medication(s) that are the same as other medications prescribed for you. Read the directions carefully, and ask your doctor or other care provider to review them with you.

## 2017-03-03 NOTE — PROGRESS NOTES
Fit patient per order with an [place] collar and extra pad set and a [place] collar for showering.

## 2017-03-03 NOTE — PHARMACY-ADMISSION MEDICATION HISTORY
Medication reconciliation interview completed by pre-admitting nurse Ashly Farfan, reviewed by pharmacy. No further clarifications needed.       Prior to Admission medications    Medication Sig Last Dose Taking? Auth Provider   HYDROcodone-acetaminophen (NORCO) 5-325 MG per tablet Take 1-2 tablets by mouth every 6 hours as needed for moderate to severe pain  Yes Roxie Qureshi MD   liraglutide (VICTOZA) 18 MG/3ML soln Inject 1.2 mg Subcutaneous daily   Yes Reported, Patient   TIZANIDINE HCL PO Take 4 mg by mouth every morning  Yes Unknown, Entered By History   TIZANIDINE HCL PO Take 8 mg by mouth At Bedtime  Yes Unknown, Entered By History   OMEPRAZOLE PO Take 20 mg by mouth 2 times daily (before meals)  Yes Unknown, Entered By History   FLUoxetine HCl (PROZAC PO) Take 20 mg by mouth daily  Yes Reported, Patient   glimepiride (AMARYL) 2 MG tablet Take 2 mg by mouth 2 times daily  Yes Reported, Patient   METFORMIN HCL PO Take 1,000 mg by mouth 2 times daily (with meals)  Yes Reported, Patient   GABAPENTIN PO Take 1,200 mg by mouth 2 times daily  Yes Reported, Patient   OXAZEPAM PO Take 10 mg by mouth 2 times daily  Yes Reported, Patient   HYDROXYZINE HCL PO Take 25 mg by mouth 3 times daily as needed for other (anxiety)  Yes Reported, Patient   LORAZEPAM PO Take 2 mg by mouth nightly as needed for anxiety   Yes Reported, Patient   METOPROLOL SUCCINATE ER PO Take 25 mg by mouth daily  Yes Reported, Patient   NORTRIPTYLINE HCL PO Take 50 mg by mouth At Bedtime   Yes Reported, Patient   SIMVASTATIN PO Take 20 mg by mouth daily  Yes Reported, Patient   albuterol (PROAIR HFA, PROVENTIL HFA, VENTOLIN HFA) 108 (90 BASE) MCG/ACT inhaler Inhale 2 puffs into the lungs every 6 hours as needed   Yes Reported, Patient

## 2017-03-04 ENCOUNTER — APPOINTMENT (OUTPATIENT)
Dept: PHYSICAL THERAPY | Facility: CLINIC | Age: 52
DRG: 857 | End: 2017-03-04
Attending: ORTHOPAEDIC SURGERY
Payer: COMMERCIAL

## 2017-03-04 PROBLEM — T81.9XXA POST-OPERATIVE COMPLICATION: Status: ACTIVE | Noted: 2017-03-04

## 2017-03-04 LAB
BASOPHILS # BLD AUTO: 0 10E9/L (ref 0–0.2)
BASOPHILS NFR BLD AUTO: 0.2 %
CREAT SERPL-MCNC: 1.25 MG/DL (ref 0.52–1.04)
DIFFERENTIAL METHOD BLD: ABNORMAL
EOSINOPHIL # BLD AUTO: 0 10E9/L (ref 0–0.7)
EOSINOPHIL NFR BLD AUTO: 0.2 %
ERYTHROCYTE [DISTWIDTH] IN BLOOD BY AUTOMATED COUNT: 15 % (ref 10–15)
GFR SERPL CREATININE-BSD FRML MDRD: 45 ML/MIN/1.7M2
GLUCOSE BLDC GLUCOMTR-MCNC: 124 MG/DL (ref 70–99)
GLUCOSE BLDC GLUCOMTR-MCNC: 144 MG/DL (ref 70–99)
GLUCOSE BLDC GLUCOMTR-MCNC: 168 MG/DL (ref 70–99)
GLUCOSE BLDC GLUCOMTR-MCNC: 75 MG/DL (ref 70–99)
GLUCOSE BLDC GLUCOMTR-MCNC: 77 MG/DL (ref 70–99)
HCT VFR BLD AUTO: 30.8 % (ref 35–47)
HGB BLD-MCNC: 9.8 G/DL (ref 11.7–15.7)
IMM GRANULOCYTES # BLD: 0 10E9/L (ref 0–0.4)
IMM GRANULOCYTES NFR BLD: 0.3 %
LYMPHOCYTES # BLD AUTO: 3.7 10E9/L (ref 0.8–5.3)
LYMPHOCYTES NFR BLD AUTO: 32.1 %
MCH RBC QN AUTO: 25.9 PG (ref 26.5–33)
MCHC RBC AUTO-ENTMCNC: 31.8 G/DL (ref 31.5–36.5)
MCV RBC AUTO: 81 FL (ref 78–100)
MONOCYTES # BLD AUTO: 0.7 10E9/L (ref 0–1.3)
MONOCYTES NFR BLD AUTO: 5.6 %
NEUTROPHILS # BLD AUTO: 7.2 10E9/L (ref 1.6–8.3)
NEUTROPHILS NFR BLD AUTO: 61.6 %
NRBC # BLD AUTO: 0 10*3/UL
NRBC BLD AUTO-RTO: 0 /100
PLATELET # BLD AUTO: 311 10E9/L (ref 150–450)
RBC # BLD AUTO: 3.79 10E12/L (ref 3.8–5.2)
VANCOMYCIN SERPL-MCNC: 31.2 MG/L
WBC # BLD AUTO: 11.6 10E9/L (ref 4–11)

## 2017-03-04 PROCEDURE — 40000193 ZZH STATISTIC PT WARD VISIT

## 2017-03-04 PROCEDURE — 80202 ASSAY OF VANCOMYCIN: CPT | Performed by: ORTHOPAEDIC SURGERY

## 2017-03-04 PROCEDURE — 99222 1ST HOSP IP/OBS MODERATE 55: CPT | Performed by: INTERNAL MEDICINE

## 2017-03-04 PROCEDURE — 00000146 ZZHCL STATISTIC GLUCOSE BY METER IP

## 2017-03-04 PROCEDURE — 82565 ASSAY OF CREATININE: CPT | Performed by: ORTHOPAEDIC SURGERY

## 2017-03-04 PROCEDURE — 97116 GAIT TRAINING THERAPY: CPT | Mod: GP

## 2017-03-04 PROCEDURE — 36415 COLL VENOUS BLD VENIPUNCTURE: CPT | Performed by: INTERNAL MEDICINE

## 2017-03-04 PROCEDURE — 12000000 ZZH R&B MED SURG/OB

## 2017-03-04 PROCEDURE — 40000894 ZZH STATISTIC OT IP EVAL DEFER: Performed by: STUDENT IN AN ORGANIZED HEALTH CARE EDUCATION/TRAINING PROGRAM

## 2017-03-04 PROCEDURE — 97161 PT EVAL LOW COMPLEX 20 MIN: CPT | Mod: GP

## 2017-03-04 PROCEDURE — 36415 COLL VENOUS BLD VENIPUNCTURE: CPT | Performed by: ORTHOPAEDIC SURGERY

## 2017-03-04 PROCEDURE — 87040 BLOOD CULTURE FOR BACTERIA: CPT | Performed by: INTERNAL MEDICINE

## 2017-03-04 PROCEDURE — 85025 COMPLETE CBC W/AUTO DIFF WBC: CPT | Performed by: ORTHOPAEDIC SURGERY

## 2017-03-04 PROCEDURE — 25000125 ZZHC RX 250: Performed by: ORTHOPAEDIC SURGERY

## 2017-03-04 PROCEDURE — 25000128 H RX IP 250 OP 636: Performed by: ORTHOPAEDIC SURGERY

## 2017-03-04 PROCEDURE — E2402 NEG PRESS WOUND THERAPY PUMP: HCPCS

## 2017-03-04 PROCEDURE — 25000132 ZZH RX MED GY IP 250 OP 250 PS 637: Performed by: ORTHOPAEDIC SURGERY

## 2017-03-04 RX ORDER — CEFAZOLIN SODIUM 1 G/50ML
1250 SOLUTION INTRAVENOUS EVERY 12 HOURS
Status: DISCONTINUED | OUTPATIENT
Start: 2017-03-05 | End: 2017-03-06

## 2017-03-04 RX ADMIN — METFORMIN HYDROCHLORIDE 1000 MG: 500 TABLET ORAL at 08:15

## 2017-03-04 RX ADMIN — GLIMEPIRIDE 2 MG: 1 TABLET ORAL at 08:15

## 2017-03-04 RX ADMIN — ACETAMINOPHEN 975 MG: 325 TABLET, FILM COATED ORAL at 12:01

## 2017-03-04 RX ADMIN — VANCOMYCIN HYDROCHLORIDE 1250 MG: 10 INJECTION, POWDER, LYOPHILIZED, FOR SOLUTION INTRAVENOUS at 15:55

## 2017-03-04 RX ADMIN — ACETAMINOPHEN 975 MG: 325 TABLET, FILM COATED ORAL at 21:46

## 2017-03-04 RX ADMIN — OXYCODONE HYDROCHLORIDE 10 MG: 5 TABLET ORAL at 08:16

## 2017-03-04 RX ADMIN — FLUOXETINE 20 MG: 20 CAPSULE ORAL at 08:15

## 2017-03-04 RX ADMIN — VANCOMYCIN HYDROCHLORIDE 1250 MG: 10 INJECTION, POWDER, LYOPHILIZED, FOR SOLUTION INTRAVENOUS at 08:19

## 2017-03-04 RX ADMIN — METFORMIN HYDROCHLORIDE 1000 MG: 500 TABLET ORAL at 18:10

## 2017-03-04 RX ADMIN — OMEPRAZOLE 20 MG: 20 CAPSULE, DELAYED RELEASE ORAL at 07:05

## 2017-03-04 RX ADMIN — TIZANIDINE 8 MG: 4 TABLET ORAL at 21:48

## 2017-03-04 RX ADMIN — ACETAMINOPHEN 975 MG: 325 TABLET, FILM COATED ORAL at 04:25

## 2017-03-04 RX ADMIN — TIZANIDINE 4 MG: 4 TABLET ORAL at 08:19

## 2017-03-04 RX ADMIN — CEFTRIAXONE 1 G: 1 INJECTION, POWDER, FOR SOLUTION INTRAMUSCULAR; INTRAVENOUS at 21:48

## 2017-03-04 RX ADMIN — GABAPENTIN 1200 MG: 600 TABLET, FILM COATED ORAL at 21:47

## 2017-03-04 RX ADMIN — GABAPENTIN 1200 MG: 600 TABLET, FILM COATED ORAL at 08:16

## 2017-03-04 RX ADMIN — OXYCODONE HYDROCHLORIDE 10 MG: 5 TABLET ORAL at 15:54

## 2017-03-04 RX ADMIN — SENNOSIDES AND DOCUSATE SODIUM 2 TABLET: 8.6; 5 TABLET ORAL at 08:15

## 2017-03-04 RX ADMIN — SENNOSIDES AND DOCUSATE SODIUM 1 TABLET: 8.6; 5 TABLET ORAL at 21:46

## 2017-03-04 RX ADMIN — LIRAGLUTIDE 1.2 MG: 6 INJECTION SUBCUTANEOUS at 08:24

## 2017-03-04 RX ADMIN — OXYCODONE HYDROCHLORIDE 10 MG: 5 TABLET ORAL at 21:48

## 2017-03-04 RX ADMIN — NORTRIPTYLINE HYDROCHLORIDE 50 MG: 25 CAPSULE ORAL at 21:47

## 2017-03-04 RX ADMIN — SIMVASTATIN 20 MG: 20 TABLET, FILM COATED ORAL at 08:16

## 2017-03-04 RX ADMIN — OMEPRAZOLE 20 MG: 20 CAPSULE, DELAYED RELEASE ORAL at 15:55

## 2017-03-04 RX ADMIN — DOCUSATE SODIUM 50 MG: 50 LIQUID ORAL at 08:19

## 2017-03-04 RX ADMIN — OXYCODONE HYDROCHLORIDE 10 MG: 5 TABLET ORAL at 04:25

## 2017-03-04 RX ADMIN — OXYCODONE HYDROCHLORIDE 10 MG: 5 TABLET ORAL at 12:01

## 2017-03-04 NOTE — PROGRESS NOTES
03/04/17 1400   Quick Adds   Type of Visit Initial PT Evaluation   Living Environment   Living Environment Comment pt lives with spouse in a mobile home. pt has 4 ANA PAULA with one HR. pt was using a SPC and FWW prior to admission.   Self-Care   Dominant Hand right   Equipment Currently Used at Home cane, straight;wheelchair;walker, rolling   Functional Level Prior   Ambulation 1-->assistive equipment   Transferring 0-->independent   Toileting 0-->independent   Bathing 0-->independent   Dressing 0-->independent   Fall history within last six months yes   Number of times patient has fallen within last six months 6  (pt states she falls ~ twice a week)   Which of the above functional risks had a recent onset or change? ambulation;fall history   General Information   Onset of Illness/Injury or Date of Surgery - Date 03/02/17   Referring Physician Juan Gray MD   Patient/Family Goals Statement discharge home with spouse   Pertinent History of Current Problem (include personal factors and/or comorbidities that impact the POC) 51-year-old woman who has diabetic underwent several weeks ago posterior cervical fusion at [facility].  Then presented several weeks later with tachycardia, diaphoresis, nausea.  CT scan showed most likely deep wound infection, collection of large amount of fluid wound was red and erythematous.  Labs showed a white count of almost 4000,  high CRP and sed rate.  Based upon this urgent I&D and wound VAC application was performed   Cognitive Status Examination   Orientation orientation to person, place and time   Level of Consciousness alert   Follows Commands and Answers Questions 100% of the time   Personal Safety and Judgment intact   Pain Assessment   Patient Currently in Pain Yes, see Vital Sign flowsheet   Posture    Posture (unremarkable)   Strength   Strength Comments LE strength WFL B/L   Bed Mobility   Bed Mobility Comments pt was SBA for all bed mobility   Transfer Skills  "  Transfer Comments pt was CGA with sit to stand transfers with a FWW   Gait   Gait Comments pt ambulated 50 feet with a FWW and CGA. pt tends to sway towards the left and pt required cuing to stay within the walker while pivoting   Balance   Balance Comments pt was steady in standing with a FWW   Sensory Examination   Sensory Perception Comments (pt demonstrated decreased sensation on R LE)   Coordination   Coordination Comments (unremarkable)   General Therapy Interventions   Planned Therapy Interventions balance training;gait training;transfer training;strengthening;risk factor education;home program guidelines;progressive activity/exercise   Clinical Impression   Criteria for Skilled Therapeutic Intervention yes, treatment indicated   PT Diagnosis decreased mobility   Influenced by the following impairments DM, recent surgery, infection   Functional limitations due to impairments transfers, ambulation and elevation   Clinical Presentation Stable/Uncomplicated   Clinical Presentation Rationale pt's vitals are stable and pt's mobility is increasing   Clinical Decision Making (Complexity) Low complexity   Therapy Frequency` 2 times/day   Predicted Duration of Therapy Intervention (days/wks) 3 days   Anticipated Equipment Needs at Discharge front wheeled walker   Anticipated Discharge Disposition Home with Home Therapy;Home with Assist   Risk & Benefits of therapy have been explained Yes   Patient, Family & other staff in agreement with plan of care Yes   [facility] [employer]-PAC TM \"6 Clicks\"   2016, Trustees of [facility], under license to [employer].  All rights reserved.   6 Clicks Short Forms Basic Mobility Inpatient Short Form   [facility] AM-PAC  \"6 Clicks\" V.2 Basic Mobility Inpatient Short Form   1. Turning from your back to your side while in a flat bed without using bedrails? 3 - A Little   2. Moving from lying on your back to sitting on the side of a flat bed without using bedrails? 3 - A " Little   3. Moving to and from a bed to a chair (including a wheelchair)? 3 - A Little   4. Standing up from a chair using your arms (e.g., wheelchair, or bedside chair)? 3 - A Little   5. To walk in hospital room? 3 - A Little   6. Climbing 3-5 steps with a railing? 3 - A Little   Basic Mobility Raw Score (Score out of 24.Lower scores equate to lower levels of function) 18   Total Evaluation Time   Total Evaluation Time (Minutes) 10

## 2017-03-04 NOTE — PHARMACY-VANCOMYCIN DOSING SERVICE
Pharmacy Vancomycin Initial Note  Date of Service March 3, 2017  Patient's  1965  51 year old, female    Indication: Sepsis    Current estimated CrCl = Estimated Creatinine Clearance: 90.4 mL/min (based on Cr of 0.86).    Creatinine for last 3 days  No results found for requested labs within last 72 hours.    Recent Vancomycin Level(s) for last 3 days  No results found for requested labs within last 72 hours.      Vancomycin IV Administrations (past 72 hours)                   vancomycin vial 1000 mg (g) 1 g Given 17 1556                Nephrotoxins and other renal medications (Future)    Start     Dose/Rate Route Frequency Ordered Stop    17 0000  vancomycin (VANCOCIN) 1,250 mg in NaCl 0.9 % 250 mL intermittent infusion      1,250 mg Intravenous EVERY 8 HOURS 17 1856            Contrast Orders - past 72 hours     None                Plan:  1.  Start vancomycin  1250 mg IV q8h.   2.  Goal Trough Level: 15-20 mg/L   3.  Pharmacy will check trough levels as appropriate in 1-3 Days.    4. Serum creatinine levels will be ordered daily for the first week of therapy and at least twice weekly for subsequent weeks.    5. [place] method utilized to dose vancomycin therapy: Method 1    Kayla Roque

## 2017-03-04 NOTE — CONSULTS
"Care Transition Initial Assessment - RN    Met with: Patient.    DATA   Active Problems:    Wound infection       Cognitive Status: awake, alert and oriented.     Contact information and PCP information verified: Yes   Dr. Luca Schuster, [facility] - Pt stated she is interested in changing PCP. CTS will check back with her when closer to DC.    Lives With: significant other  Patient verified she lives in [facility] with per partner, Anahi.     Insurance concerns: No Insurance issues identified    ASSESSMENT  Patient currently receives the following services:  NONE        Identified issues/concerns regarding health management:   Met patient at bedside.  Pt stated she is independent at baseline.  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  DME at home include walker/cane/WC.  She uses this equipment intermittently - \"when I feel like I need it\".  Partner Anahi is very helpful with personal cares and \"whatever else I need\".  Anahi also provided all transportation needs, Pt states she hasn't driven since previous surgery.    Patient has a hx of DM2.  States she has had good PO intake.  Has a glucometer at home and will check blood sugars once in a while.  Last A1c 10.6 was drawn on 2/10/17.  On SSI while here in hosp.  BS has been stable.    PLAN  DC planning is ongoing.  PT/OT and ID have been consulted and we are awaiting their recommendations.      Patient currently has wound vac, she may need home care if plan to dc with vac in place.  She may need OP infusion for long term abx - awaiting ID     Patient anticipates needs for home equipment: Does not know    Care  (CTS) will continue to follow for DC planning and coordination.    Shy Patterson, RN,BSN, CTS  [facility]  Care Coordination  [phone]        "

## 2017-03-04 NOTE — PLAN OF CARE
Problem: Goal Outcome Summary  Goal: Goal Outcome Summary  OT: Order received, chart reviewed, at this time per PT, there are no IP OT needs, will complete OT order at this time, if any changes or concerns arise feel free to re-consult as appropriate, thank you.

## 2017-03-04 NOTE — ANESTHESIA POSTPROCEDURE EVALUATION
Patient: Jessica Rucker    Procedure(s):  Incision and drainage of cervical spinal wound infection with wound vac placement - Wound Class: I-Clean    Diagnosis:Deep posterior wound infection  Diagnosis Additional Information: Pre-operative diagnosis: Deep posterior wound infection  Post-operative diagnosis deep wound infection posterior cervical spine ; Seroma and purulent necrotic material sent to lab for aerobic and anerobic culture  Procedure: Procedure(s):  Incision a, nd drainage of cervical spinal wound infection with wound vac placement - Wound Class: I-Clean        Anesthesia Type:  General, ETT    Note:  Anesthesia Post Evaluation    Patient location during evaluation: PACU  Patient participation: Able to fully participate in evaluation  Level of consciousness: awake  Pain management: adequate  Airway patency: patent  Cardiovascular status: acceptable  Respiratory status: acceptable  Hydration status: euvolemic  PONV: controlled     Anesthetic complications: None          Last vitals:  Vitals:    03/03/17 1750 03/03/17 1800 03/03/17 1816   BP: 130/84 (!) 124/93    Resp:  21    Temp: 97.2  F (36.2  C)     SpO2: 98% 97% 97%         Electronically Signed By: Enmanuel Padilla MD  March 3, 2017  6:23 PM

## 2017-03-04 NOTE — PROGRESS NOTES
Afebrile   Feels much better   Labs pending.   Neuro intact.   Wound vac working well.   Pending ID consult and culture reports.  Dressing change Monday.

## 2017-03-04 NOTE — PLAN OF CARE
Problem: Laminectomy/Foraminotomy/Discectomy (Adult)  Goal: Signs and Symptoms of Listed Potential Problems Will be Absent or Manageable (Laminectomy/Foraminotomy/Discectomy)  Signs and symptoms of listed potential problems will be absent or manageable by discharge/transition of care (reference Laminectomy/Foraminotomy/Discectomy (Adult) CPG).   Outcome: Improving  A&O x4, LS CTA all fields, BS active x4 with no reported BM since 2/28. Wound vac to posterior neck is 75mmHg continuous suction with no change in output level this shift.  dee dee PO clears well, up with A2 and walker, unsteady and had dizziness on standing at baseline due to menieres disease, PO oxycodone managing pain, voiding in good amts, plans to dc home early next week.

## 2017-03-04 NOTE — H&P
CONSULTATION      REQUESTING PHYSICIAN:  Juan Gray MD      REASON FOR CONSULTATION:  Management of diabetes and IV antibiotics.      HISTORY OF PRESENT ILLNESS:  The patient Jessica Rucker is a 51-year-old white female    who is status post irrigation and debridement of a posterior cervical wound infection and wound VAC application.  She had undergone C5-C7 anterior cervical diskectomy and fusion on 02/09/20179.  Since then she had noted swelling and pain and was noted to have a possible infection.  She has now undergone irrigation and debridement of the wound.      PAST MEDICAL HISTORY:     1.  Hyperlipidemia.    2.  Fibromyalgia.   3.  Type 2 diabetes.   4.  Depression.   5.  Hypertension.   6.  Meniere's disease.   7.  Stage 3 chronic kidney disease.      OUTPATIENT MEDICATIONS:     1.  Albuterol.   2.  Colace.   3.  Fluoxetine.   4.  Gabapentin.   5.  Glimepiride.   6.  Hydroxyzine.   7.  Metformin.   8.  Victoza.   9.  Nortriptyline.   10.  Omeprazole.   11.  Zocor.   12.  Triamterene/HCTZ.      ALLERGIES:     1.  Wellbutrin.   2.  Adhesive bandages.   3.  Tetanus toxoid.      SOCIAL HISTORY:  The patient is a former smoker.  She does not drink alcohol or use illicit drugs.      FAMILY HISTORY:  Significant for heart disease in the patient's parents.      REVIEW OF SYSTEMS:  The patient denies chest pain or shortness of breath.  She feels that the pain in her neck is much better.  She denies lightheadedness or dizziness.  All other systems are extensively reviewed and deemed unremarkable and/or negative.      PHYSICAL EXAMINATION:   VITAL SIGNS:  Temperature 97.9, pulse 98, blood pressure 109/73, respiratory rate 18,    O2 saturations 97% on room air.   GENERAL:  The patient is alert, awake, oriented, coherent, nontoxic and in no acute distress.   HEENT:  Pupils equal, round, react to light.   NECK:  She has a C-collar in place with a wound VAC.   RESPIRATORY:  Lungs clear to auscultation bilaterally.    HEART:  Regular rate, S1, S2 normal, no murmurs or gallops.   ABDOMEN:  Soft, nontender with good bowel sounds.   EXTREMITIES:  There is no edema.   SKIN:  There are tattoos.  There is no rash noted.      LABORATORY STUDIES OBTAINED IN HOSPITAL:     1.  Creatinine 1.25 with a GFR of 45.     2.  On a CBC a white cell count is 11.6, hemoglobin 9.8, hematocrit 30.8, platelet count 311.     3.  Gram stains and cultures from the surgery are pending.      ASSESSMENT AND PLAN:   1.  Status post irrigation and debridement of posterior cervical wound infection with wound VAC application.  She is presently on Rocephin and vancomycin.  I will put in a consult for Pharmacy to dose vancomycin.  Infectious Disease has been consulted for antibiotic management,  To date her cultures    are negative.   2.  Diabetes.  She is on Victoza, metformin and Amaryl.  I will take her off the Amaryl given    the unpredictability of her eating and will continue with sliding scale insulin for now.   3.  Hypertension.  She is on metoprolol.   4.  Hyperlipidemia.  She is on Zocor.        We will follow the patient with you.  Please do not hesitate to contact us if you have questions.         ISRRAEL ACOSTA MD             D: 2017 11:27   T: 2017 12:03   MT: [id number]      Name:     ELI ARAUZ   MRN:      [id number]        Account:      [id number]   :      1965           Admitted:     [id number]      Document: [id number]       cc: Luca Schuster MD

## 2017-03-04 NOTE — PLAN OF CARE
Problem: Goal Outcome Summary  Goal: Goal Outcome Summary  Outcome: No Change  Pt on clear liquids.  Blood sugar was 303 at 2200.  Voided multiple times in PACU.  Tolerating Oxycodone for pain.  SBA.  Takes several muscle relaxers at home.  Did not give Ativan with others since she just had surgery.

## 2017-03-04 NOTE — OP NOTE
DATE OF PROCEDURE:  03/03/2017      PREOPERATIVE DIAGNOSIS:  Posterior cervical deep wound infection status post posterior cervical fusion.      POSTOPERATIVE DIAGNOSIS:  Posterior cervical deep wound infection status post posterior cervical fusion.        SURGEON:  Juan Gray MD      1ST ASSISTANT:  Gurpreet Adkins CMP      PROCEDURES:   1.  I&D of the posterior cervical wound infection.   2.  Wound VAC application.        OPERATIVE FINDINGS:  Deep wound infection going down to the hardware and bone.  Seroma complicated by necrotic purulent material present overlying the hardware and bone.  Some sharp debridement needed to take out the necrotic material.      ESTIMATED BLOOD LOSS:  30 mL.      COMPLICATIONS:  None.      INDICATIONS:  Jessica Rucker is a 51-year-old woman who has diabetic underwent several weeks ago posterior cervical fusion at [facility].  Then presented several weeks later with tachycardia, diaphoresis, nausea.  CT scan showed most likely deep wound infection, collection of large amount of fluid wound was red and erythematous.  Labs showed a white count of almost 4000,  high CRP and sed rate.  Based upon this urgent I&D and wound VAC application was recommended.  The nature of procedure and risks were discussed.  The patient opted to proceed.      DESCRIPTION OF PROCEDURE:  The patient was brought to the operating theater.  General endotracheal anesthesia was induced in an uneventful manner.  The patient was carefully log rolled into prone position onto the Emil SI frame.  Diallo-Wells tongs was applied extension mode traction.  15 pounds was instituted.  Posterior cervical area was then prepped and draped using DuraPrep.  Sutures were removed which were nylon baseball type of stitch sutures and once the wound was incised serous material came out first.  Further exploration wound showed the purulent material sitting on top of the hardware and the posterior cervical spine indicative  of wound infection.  Aerobic and anaerobic cultures were taken.  After that, the patient was given 1 gram of vancomycin IV.  Sharp debridement was carried out, taking out some necrotic material.  Three liters of Pulsavac lavage with Ancef then was used to wash out the wound.  Wound VAC sponge was then cut.  Around the wound edges Vaseline gauze was used so that the sponge would not stick to the wound.  50-75 low continuous suction was applied without air leak.  Patient tolerated the procedure well.  The patient turned into supine position.  [facility] tongs was detached.  Patient was taken to the recovery room in good condition.  The patient tolerated the procedure well.     Note sharp debridement was carried out using scalpel and rongeurs to the bone and hardware through the muscles with the distance down to the bone from the skin about 8cm or so.   Some necrotic purulent material was noted.       LORNE PORTER MD             D: 2017 16:21   T: 2017 03:49   MT: [id number]      Name:     ELI ARAUZ   MRN:      [id number]        Account:        [id number]   :      1965           Procedure Date: 2017      Document: [id number]

## 2017-03-04 NOTE — CONSULTS
"Note INFECTIOUS DISEASES CONSULTATION NOTE  Covering for Sallie Wallace and Jasmin     Date 3/4/2017     Name /  Jessica Rucker   1965     MRN [id number]       Thank you for asking us to see Jessica Rucker for infectious diseases evaluation    REASON FOR CONSULT postop op deep cervical spine posterior incision infection  REQUESTING PROVIDER Dr SUSANNA Gray    CHIEF COMPLAINT    Posterior neck incision bulge  HPI  52 yo w apparent deep C-spine infection after elective surgery    2.9 C-spine fusion  Per pt, at time surgery, no indication infection    Went home, initially all was well  NO systemic antibiotic use (for any reason) during this time  Developed progressive swelling (like size of tennis ball) posterior incision  Also developed some \"scabs\", tho' pt said never had drainage  No fever  No systemic signs / symptoms  No limb weakness  No unusual headache  Appetite was \"OK\"    3.3 To OR. From OP note  Deep wound infection going down to the hardware and bone. Seroma complicated by necrotic purulent material present overlying the hardware and bone. Some sharp debridement needed to take out the necrotic material.     ROS    Remainder of 16 pt ROS negative except above    OTHER HISTORY  Past Medical History   Diagnosis Date     Anxiety      Depression      Gastro-oesophageal reflux disease      Hyperlipidemia      Hypertension      No cardiologist     Meniere's disease      Neuropathy (H)      Numbness and tingling      right leg     Other chronic pain      mid to low back & radiates down right leg     PONV (postoperative nausea and vomiting)      Type 2 diabetes mellitus without complications (H)      type 2     Uncomplicated asthma      Past Surgical History   Procedure Laterality Date     Hernia repair       left inguinal hernia repair      section       Gyn surgery       hysterectomy     Shoulder surgery       right x 6     Back surgery       cervical fusion Dec 2013     Fusion spine posterior minimally " invasive one level  5/22/2014     Procedure: FUSION SPINE POSTERIOR MINIMALLY INVASIVE ONE LEVEL;  Surgeon: Roxie Qureshi MD;  Location: [facility] OR     Fusion cervical posterior two levels N/A 2/9/2017     Procedure: FUSION CERVICAL POSTERIOR TWO LEVELS;  Surgeon: Roxie Qureshi MD;  Location: [facility] OR       Social History     Social History     Marital status: Single     Spouse name: N/A     Number of children: N/A     Years of education: N/A     Social History Main Topics     Smoking status: Former Smoker     Packs/day: 0.50     Years: 10.00     Types: Cigarettes     Smokeless tobacco: Never Used      Comment: quit 4 yrs ago     Alcohol use No     Drug use: No     Sexual activity: Not Asked     Other Topics Concern     None     Social History Narrative     Allergies   Allergen Reactions     Tetanus Toxoid Anaphylaxis     Wellbutrin [Bupropion] Other (See Comments)     confusion     Adhesive Tape Rash     Cloth and paper tape ok     There is no immunization history for the selected administration types on file for this patient.  Prescription Medications as of 3/4/2017             sennosides (SENOKOT) 8.6 MG tablet Take 1 tablet by mouth daily as needed for constipation    Docusate Sodium (COLACE PO) Take 50 mg by mouth daily    HYDROcodone-acetaminophen (NORCO) 5-325 MG per tablet Take 1-2 tablets by mouth every 6 hours as needed for moderate to severe pain    liraglutide (VICTOZA) 18 MG/3ML soln Inject 1.2 mg Subcutaneous daily     TIZANIDINE HCL PO Take 4 mg by mouth every morning    TIZANIDINE HCL PO Take 8 mg by mouth At Bedtime    OMEPRAZOLE PO Take 20 mg by mouth 2 times daily (before meals)    FLUoxetine HCl (PROZAC PO) Take 20 mg by mouth daily    glimepiride (AMARYL) 2 MG tablet Take 2 mg by mouth 2 times daily    METFORMIN HCL PO Take 1,000 mg by mouth 2 times daily (with meals)    GABAPENTIN PO Take 1,200 mg by mouth 2 times daily    OXAZEPAM PO Take 10 mg by mouth 2 times daily    HYDROXYZINE HCL PO Take 25 mg  by mouth 3 times daily as needed for other (anxiety)    LORAZEPAM PO Take 2 mg by mouth nightly as needed for anxiety     METOPROLOL SUCCINATE ER PO Take 25 mg by mouth daily    NORTRIPTYLINE HCL PO Take 50 mg by mouth At Bedtime     SIMVASTATIN PO Take 20 mg by mouth daily    albuterol (PROAIR HFA, PROVENTIL HFA, VENTOLIN HFA) 108 (90 BASE) MCG/ACT inhaler Inhale 2 puffs into the lungs every 6 hours as needed       Facility Administered Medications as of 3/4/2017             albuterol (PROAIR HFA/PROVENTIL HFA/VENTOLIN HFA) Inhaler 2 puff Inhale 2 puffs into the lungs every 6 hours as needed for shortness of breath / dyspnea    docusate (COLACE) 50 MG/5ML liquid 50 mg Take 5 mLs (50 mg) by mouth daily    FLUoxetine (PROzac) capsule 20 mg Take 1 capsule (20 mg) by mouth daily    gabapentin (NEURONTIN) tablet 1,200 mg Take 2 tablets (1,200 mg) by mouth 2 times daily    hydrOXYzine (ATARAX) tablet 25 mg Take 1 tablet (25 mg) by mouth 3 times daily as needed for other (anxiety)    liraglutide (VICTOZA) injection 1.2 mg Inject 1.2 mg Subcutaneous daily    LORazepam (ATIVAN) tablet 2 mg Take 2 tablets (2 mg) by mouth nightly as needed for anxiety    metFORMIN (GLUCOPHAGE) tablet 1,000 mg Take 2 tablets (1,000 mg) by mouth 2 times daily (with meals)    metoprolol (TOPROL-XL) 24 hr tablet 25 mg Take 1 tablet (25 mg) by mouth daily    nortriptyline (PAMELOR) capsule 50 mg Take 2 capsules (50 mg) by mouth At Bedtime    omeprazole (priLOSEC) CR capsule 20 mg Take 1 capsule (20 mg) by mouth 2 times daily (before meals)    oxazepam (SERAX) capsule 10 mg Take 1 capsule (10 mg) by mouth 2 times daily    sennosides (SENOKOT) tablet 1 tablet Take 1 tablet by mouth daily as needed for constipation    simvastatin (ZOCOR) tablet 20 mg Take 1 tablet (20 mg) by mouth daily    tiZANidine (ZANAFLEX) tablet 4 mg Take 1 tablet (4 mg) by mouth every morning    tiZANidine (ZANAFLEX) tablet 8 mg Take 2 tablets (8 mg) by mouth At Bedtime     "lidocaine 1 % 1 mL 1 mL by Other route every hour as needed (mild pain with VAD insertion or accessing implanted port)    lidocaine (LMX4) kit Apply topically every hour as needed for pain (with VAD insertion or accessing implanted port.)    sodium chloride (PF) 0.9% PF flush 3 mL 3 mLs by Intracatheter route every hour as needed for line flush (for peripheral IV flush post IV meds)    sodium chloride (PF) 0.9% PF flush 3 mL 3 mLs by Intracatheter route every 8 hours    naloxone (NARCAN) injection 0.1-0.4 mg Inject 0.25-1 mLs (0.1-0.4 mg) into the vein every 2 minutes as needed for opioid reversal    LORazepam (ATIVAN) injection 0.5-1 mg Inject 0.25-0.5 mLs (0.5-1 mg) into the vein every 6 hours as needed for anxiety or muscle spasms    Linked Group 1:  \"Or\" Linked Group Details     LORazepam (ATIVAN) tablet 0.5-1 mg Take 1-2 tablets (0.5-1 mg) by mouth every 6 hours as needed for anxiety or muscle spasms    Linked Group 1:  \"Or\" Linked Group Details     morphine (PF) injection 4-6 mg Inject 4-6 mg into the vein every 2 hours as needed for severe pain (or patient unable to take PO)    acetaminophen (TYLENOL) tablet 975 mg Take 3 tablets (975 mg) by mouth every 8 hours    acetaminophen (TYLENOL) tablet 650 mg Starting on 3/6/2017. Take 2 tablets (650 mg) by mouth every 4 hours as needed for other (surgical pain)    oxyCODONE (ROXICODONE) IR tablet 5-10 mg Take 1-2 tablets (5-10 mg) by mouth every 3 hours as needed for moderate to severe pain    ondansetron (ZOFRAN-ODT) ODT tab 4 mg Take 1 tablet (4 mg) by mouth every 6 hours as needed for nausea    Linked Group 2:  \"Or\" Linked Group Details     ondansetron (ZOFRAN) injection 4 mg Inject 2 mLs (4 mg) into the vein every 6 hours as needed for nausea or vomiting    Linked Group 2:  \"Or\" Linked Group Details     senna-docusate (SENOKOT-S;PERICOLACE) 8.6-50 MG per tablet 1-2 tablet Take 1-2 tablets by mouth 2 times daily    glucose 40 % gel 15-30 g Take 15-30 g by mouth " "every 15 minutes as needed for low blood sugar    Linked Group 3:  \"Or\" Linked Group Details     dextrose 50 % injection 25-50 mL Inject 25-50 mLs into the vein every 15 minutes as needed for low blood sugar    Linked Group 3:  \"Or\" Linked Group Details     glucagon injection 1 mg Inject 1 mg Subcutaneous every 15 minutes as needed for low blood sugar (May repeat x 1 only)    Linked Group 3:  \"Or\" Linked Group Details     insulin aspart (NovoLOG) inj (RAPID ACTING) Inject 1-7 Units Subcutaneous 3 times daily (before meals)    insulin aspart (NovoLOG) inj (RAPID ACTING) Inject 1-5 Units Subcutaneous At Bedtime    cefTRIAXone (ROCEPHIN) 1 g vial to attach to  mL bag for ADULTS or NS 50 mL bag for PEDS Inject 1 g into the vein every 24 hours    influenza quadrivalent (PF) vacc age 3 yrs and older (FLUZONE or Flulaval) injection 0.5 mL Inject 0.5 mLs into the muscle Prior to discharge    lactated ringers infusion (Discontinued) Inject into the vein continuous    fentaNYL Citrate (PF) (SUBLIMAZE) injection 25-50 mcg (Discontinued) Inject 0.5-1 mLs (25-50 mcg) into the vein every 2 minutes as needed for other (acute pain)    ondansetron (ZOFRAN-ODT) ODT tab 4 mg (Discontinued) Take 1 tablet (4 mg) by mouth every 30 minutes as needed for nausea    Linked Group 4:  \"Or\" Linked Group Details     ondansetron (ZOFRAN) injection 4 mg (Discontinued) Inject 2 mLs (4 mg) into the vein every 30 minutes as needed for nausea    Linked Group 4:  \"Or\" Linked Group Details     prochlorperazine (COMPAZINE) injection 5-10 mg (Discontinued) Inject 1-2 mLs (5-10 mg) into the vein every 6 hours as needed for nausea or vomiting    HYDROmorphone (PF) (DILAUDID) injection 0.3-0.5 mg (Discontinued) Inject 0.3-0.5 mg into the vein every 5 minutes as needed for other (acute pain.  May administer if Respiratory Rate is greater than 10)    labetalol (NORMODYNE/TRANDATE) injection 10 mg (Discontinued) Inject 2 mLs (10 mg) into the vein once as " "needed for high blood pressure (for Systemic Blood Pressure greater than 160 and Heart Rate greater than 60 bpm.  )    naloxone (NARCAN) injection 0.1-0.4 mg (Discontinued) Inject 0.25-1 mLs (0.1-0.4 mg) into the vein every 2 minutes as needed for opioid reversal    glimepiride (AMARYL) tablet 2 mg (Discontinued) Take 2 tablets (2 mg) by mouth 2 times daily    0.45% sodium chloride + KCl 20 mEq/L infusion (Discontinued) Inject into the vein continuous    hydrOXYzine (ATARAX) tablet 25 mg (Discontinued) Take 1 tablet (25 mg) by mouth every 6 hours as needed for itching    vancomycin (VANCOCIN) 1,250 mg in NaCl 0.9 % 250 mL intermittent infusion (Discontinued) Inject 1,250 mg into the vein every 8 hours        EXAM  /67 (BP Location: Right arm)  Temp 98  F (36.7  C) (Oral)  Resp 18  Ht 1.664 m (5' 5.51\")  Wt 99.8 kg (220 lb)  SpO2 94%  BMI 36.04 kg/m2    general   In bed playing game on smart phone  no acute distress     neuro   Normal speech  5/5 hand  bilat  5/5 ankle dorsi flex bilat  Normal converstaion     skin   Tattoos both forearms  Tattoo on upper back / C- and T-spine junction  VAC dressing on wound right on tattoo     abd   Large  soft     lungs   clear     CV   No murmer     DATA  Cultures  Cervical 3/2 All neg so far (on gram stain, no organisms seen)    LABS  Recent Labs   Lab Test  03/04/17   0559  02/11/17   0609   WBC  11.6*  12.6*           ASSESSMENT   SUGGESTIONS    M96.89 Postoperative surgical complication     Post op C-spine fluid and necrotic tissue  So far, no confirmation of specific infecting agent  ? If could be tissue necrosis w/o infection  No mention of dural tear or CSF leak in Feb 9 op note to think of CSF leak playing any role    For now, cont broad spectrum antimicrobial agents   Monitor cultures  If (+) culture, tailor antibiotic choice accordingly  If (--) culture, decide either not infection and stop ATB or culture-negative and continue long term broad spectrum " antimicrobial agents          Stef Layne MD  Covering for Sallie Wallace & Jasmin & Dashawn  [facility] Infectious Diseases  [phone]

## 2017-03-04 NOTE — PLAN OF CARE
Problem: Goal Outcome Summary  Goal: Goal Outcome Summary  Surgeon Discharge Plan: not documented      Current Functional Status: pt ambulated 50 feet with a FWW (CGA), pt tends to require cuing to stay within the walker while pivoting . Pt was SBA for bed mobility and pt was CGA for sit to stand transfers with a FWW.  Recommend discharge to home with spouse and home PT     Barriers to Plan/Home: pt has 4 ANA PAULA with one HR, will attempt tomorrow.

## 2017-03-04 NOTE — PLAN OF CARE
Problem: Individualization  Goal: Patient Preferences  Outcome: Improving  RN:pt vss, BP low will cont to monitor, held BP meds.   Lungs: clear, 02 stable, encouraged IS hourly  BS: active  Diet: Mod CHO, blood sugars stable no covered needed   CMS +, thumb numbness, and right hand, baseline neuropathy in feet, encouraged D/P flexion, and PCD's are in place. In Chair with meals.  Dressing: wound vac intact.   Activity: pt up with A1-2 with pt, in chair now. Brace on.   Pain: controlled oral oral pain meds.   Output: adequate   Wound Vac: 75 running   IV antibiotics  Blood sugars stable.

## 2017-03-04 NOTE — PLAN OF CARE
Problem: Goal Outcome Summary  Goal: Goal Outcome Summary  Critical lab value of vanco level of 31.2--notified pharmacy and they will adjust dose.

## 2017-03-05 ENCOUNTER — APPOINTMENT (OUTPATIENT)
Dept: PHYSICAL THERAPY | Facility: CLINIC | Age: 52
DRG: 857 | End: 2017-03-05
Attending: ORTHOPAEDIC SURGERY
Payer: COMMERCIAL

## 2017-03-05 LAB
ANION GAP SERPL CALCULATED.3IONS-SCNC: 11 MMOL/L (ref 3–14)
BACTERIA SPEC CULT: ABNORMAL
BACTERIA SPEC CULT: NO GROWTH
BACTERIA SPEC CULT: NO GROWTH
BASOPHILS # BLD AUTO: 0 10E9/L (ref 0–0.2)
BASOPHILS NFR BLD AUTO: 0.3 %
BUN SERPL-MCNC: 15 MG/DL (ref 7–30)
CALCIUM SERPL-MCNC: 8.3 MG/DL (ref 8.5–10.1)
CHLORIDE SERPL-SCNC: 102 MMOL/L (ref 94–109)
CO2 SERPL-SCNC: 27 MMOL/L (ref 20–32)
CREAT SERPL-MCNC: 0.78 MG/DL (ref 0.52–1.04)
DIFFERENTIAL METHOD BLD: ABNORMAL
EOSINOPHIL # BLD AUTO: 0.2 10E9/L (ref 0–0.7)
EOSINOPHIL NFR BLD AUTO: 2.7 %
ERYTHROCYTE [DISTWIDTH] IN BLOOD BY AUTOMATED COUNT: 15.2 % (ref 10–15)
GFR SERPL CREATININE-BSD FRML MDRD: 77 ML/MIN/1.7M2
GLUCOSE BLDC GLUCOMTR-MCNC: 131 MG/DL (ref 70–99)
GLUCOSE BLDC GLUCOMTR-MCNC: 144 MG/DL (ref 70–99)
GLUCOSE BLDC GLUCOMTR-MCNC: 156 MG/DL (ref 70–99)
GLUCOSE BLDC GLUCOMTR-MCNC: 174 MG/DL (ref 70–99)
GLUCOSE BLDC GLUCOMTR-MCNC: 188 MG/DL (ref 70–99)
GLUCOSE BLDC GLUCOMTR-MCNC: 97 MG/DL (ref 70–99)
GLUCOSE SERPL-MCNC: 132 MG/DL (ref 70–99)
HCT VFR BLD AUTO: 28.5 % (ref 35–47)
HGB BLD-MCNC: 9.1 G/DL (ref 11.7–15.7)
IMM GRANULOCYTES # BLD: 0 10E9/L (ref 0–0.4)
IMM GRANULOCYTES NFR BLD: 0.3 %
LYMPHOCYTES # BLD AUTO: 4.4 10E9/L (ref 0.8–5.3)
LYMPHOCYTES NFR BLD AUTO: 51.3 %
MCH RBC QN AUTO: 26 PG (ref 26.5–33)
MCHC RBC AUTO-ENTMCNC: 31.9 G/DL (ref 31.5–36.5)
MCV RBC AUTO: 81 FL (ref 78–100)
MICRO REPORT STATUS: ABNORMAL
MICRO REPORT STATUS: NORMAL
MICRO REPORT STATUS: NORMAL
MICROORGANISM SPEC CULT: ABNORMAL
MONOCYTES # BLD AUTO: 0.6 10E9/L (ref 0–1.3)
MONOCYTES NFR BLD AUTO: 6.4 %
NEUTROPHILS # BLD AUTO: 3.4 10E9/L (ref 1.6–8.3)
NEUTROPHILS NFR BLD AUTO: 39 %
NRBC # BLD AUTO: 0 10*3/UL
NRBC BLD AUTO-RTO: 0 /100
PLATELET # BLD AUTO: 249 10E9/L (ref 150–450)
POTASSIUM SERPL-SCNC: 3.6 MMOL/L (ref 3.4–5.3)
RBC # BLD AUTO: 3.5 10E12/L (ref 3.8–5.2)
SODIUM SERPL-SCNC: 140 MMOL/L (ref 133–144)
SPECIMEN SOURCE: ABNORMAL
SPECIMEN SOURCE: NORMAL
SPECIMEN SOURCE: NORMAL
WBC # BLD AUTO: 8.6 10E9/L (ref 4–11)

## 2017-03-05 PROCEDURE — 97530 THERAPEUTIC ACTIVITIES: CPT | Mod: GP | Performed by: PHYSICAL THERAPIST

## 2017-03-05 PROCEDURE — 40000193 ZZH STATISTIC PT WARD VISIT: Performed by: PHYSICAL THERAPIST

## 2017-03-05 PROCEDURE — 85025 COMPLETE CBC W/AUTO DIFF WBC: CPT | Performed by: INTERNAL MEDICINE

## 2017-03-05 PROCEDURE — 25000128 H RX IP 250 OP 636: Performed by: ORTHOPAEDIC SURGERY

## 2017-03-05 PROCEDURE — 80048 BASIC METABOLIC PNL TOTAL CA: CPT | Performed by: INTERNAL MEDICINE

## 2017-03-05 PROCEDURE — 25000125 ZZHC RX 250: Performed by: ORTHOPAEDIC SURGERY

## 2017-03-05 PROCEDURE — 25000132 ZZH RX MED GY IP 250 OP 250 PS 637: Performed by: ORTHOPAEDIC SURGERY

## 2017-03-05 PROCEDURE — 99232 SBSQ HOSP IP/OBS MODERATE 35: CPT | Performed by: INTERNAL MEDICINE

## 2017-03-05 PROCEDURE — 82565 ASSAY OF CREATININE: CPT | Performed by: INTERNAL MEDICINE

## 2017-03-05 PROCEDURE — 25000132 ZZH RX MED GY IP 250 OP 250 PS 637: Performed by: INTERNAL MEDICINE

## 2017-03-05 PROCEDURE — 36415 COLL VENOUS BLD VENIPUNCTURE: CPT | Performed by: INTERNAL MEDICINE

## 2017-03-05 PROCEDURE — E2402 NEG PRESS WOUND THERAPY PUMP: HCPCS

## 2017-03-05 PROCEDURE — 97116 GAIT TRAINING THERAPY: CPT | Mod: GP | Performed by: PHYSICAL THERAPIST

## 2017-03-05 PROCEDURE — 00000146 ZZHCL STATISTIC GLUCOSE BY METER IP

## 2017-03-05 PROCEDURE — 25000128 H RX IP 250 OP 636: Performed by: INTERNAL MEDICINE

## 2017-03-05 PROCEDURE — 12000007 ZZH R&B INTERMEDIATE

## 2017-03-05 RX ORDER — LORAZEPAM 1 MG/1
2 TABLET ORAL AT BEDTIME
Status: DISCONTINUED | OUTPATIENT
Start: 2017-03-05 | End: 2017-03-07 | Stop reason: HOSPADM

## 2017-03-05 RX ORDER — OXYCODONE HYDROCHLORIDE 5 MG/1
10-15 TABLET ORAL
Status: DISCONTINUED | OUTPATIENT
Start: 2017-03-05 | End: 2017-03-07 | Stop reason: HOSPADM

## 2017-03-05 RX ORDER — CEFTRIAXONE 2 G/1
2 INJECTION, POWDER, FOR SOLUTION INTRAMUSCULAR; INTRAVENOUS EVERY 24 HOURS
Status: DISCONTINUED | OUTPATIENT
Start: 2017-03-05 | End: 2017-03-07

## 2017-03-05 RX ADMIN — OXYCODONE HYDROCHLORIDE 15 MG: 5 TABLET ORAL at 19:54

## 2017-03-05 RX ADMIN — LIRAGLUTIDE 1.2 MG: 6 INJECTION SUBCUTANEOUS at 08:44

## 2017-03-05 RX ADMIN — OXYCODONE HYDROCHLORIDE 10 MG: 5 TABLET ORAL at 06:03

## 2017-03-05 RX ADMIN — ACETAMINOPHEN 975 MG: 325 TABLET, FILM COATED ORAL at 12:58

## 2017-03-05 RX ADMIN — GABAPENTIN 1200 MG: 600 TABLET, FILM COATED ORAL at 19:52

## 2017-03-05 RX ADMIN — TIZANIDINE 8 MG: 4 TABLET ORAL at 22:03

## 2017-03-05 RX ADMIN — HYDROXYZINE HYDROCHLORIDE 25 MG: 25 TABLET ORAL at 14:14

## 2017-03-05 RX ADMIN — NORTRIPTYLINE HYDROCHLORIDE 50 MG: 25 CAPSULE ORAL at 22:04

## 2017-03-05 RX ADMIN — OMEPRAZOLE 20 MG: 20 CAPSULE, DELAYED RELEASE ORAL at 08:35

## 2017-03-05 RX ADMIN — CEFTRIAXONE 2 G: 2 INJECTION, POWDER, FOR SOLUTION INTRAMUSCULAR; INTRAVENOUS at 23:46

## 2017-03-05 RX ADMIN — FLUOXETINE 20 MG: 20 CAPSULE ORAL at 08:35

## 2017-03-05 RX ADMIN — OXYCODONE HYDROCHLORIDE 10 MG: 5 TABLET ORAL at 09:38

## 2017-03-05 RX ADMIN — Medication 1250 MG: at 18:15

## 2017-03-05 RX ADMIN — OXYCODONE HYDROCHLORIDE 10 MG: 5 TABLET ORAL at 02:52

## 2017-03-05 RX ADMIN — SENNOSIDES AND DOCUSATE SODIUM 2 TABLET: 8.6; 5 TABLET ORAL at 08:36

## 2017-03-05 RX ADMIN — METOPROLOL SUCCINATE 25 MG: 25 TABLET, EXTENDED RELEASE ORAL at 08:36

## 2017-03-05 RX ADMIN — OXYCODONE HYDROCHLORIDE 10 MG: 5 TABLET ORAL at 16:14

## 2017-03-05 RX ADMIN — GABAPENTIN 1200 MG: 600 TABLET, FILM COATED ORAL at 08:37

## 2017-03-05 RX ADMIN — OMEPRAZOLE 20 MG: 20 CAPSULE, DELAYED RELEASE ORAL at 16:15

## 2017-03-05 RX ADMIN — ACETAMINOPHEN 975 MG: 325 TABLET, FILM COATED ORAL at 02:54

## 2017-03-05 RX ADMIN — METFORMIN HYDROCHLORIDE 1000 MG: 500 TABLET ORAL at 08:37

## 2017-03-05 RX ADMIN — LORAZEPAM 2 MG: 1 TABLET ORAL at 22:03

## 2017-03-05 RX ADMIN — SIMVASTATIN 20 MG: 20 TABLET, FILM COATED ORAL at 08:36

## 2017-03-05 RX ADMIN — METFORMIN HYDROCHLORIDE 1000 MG: 500 TABLET ORAL at 18:15

## 2017-03-05 RX ADMIN — Medication 1250 MG: at 05:53

## 2017-03-05 RX ADMIN — OXYCODONE HYDROCHLORIDE 10 MG: 5 TABLET ORAL at 12:58

## 2017-03-05 RX ADMIN — TIZANIDINE 4 MG: 4 TABLET ORAL at 08:37

## 2017-03-05 RX ADMIN — SENNOSIDES AND DOCUSATE SODIUM 2 TABLET: 8.6; 5 TABLET ORAL at 19:51

## 2017-03-05 RX ADMIN — ACETAMINOPHEN 975 MG: 325 TABLET, FILM COATED ORAL at 19:52

## 2017-03-05 NOTE — PLAN OF CARE
Problem: Goal Outcome Summary  Goal: Goal Outcome Summary  PT:     Surgeon Discharge Plan: None documented-pt anticipates home     Current Functional Status: Pt able to complete all bed mobility and transfers with SBA and cues for safe technique. Pt able to ambulate 200' with use of FWW, SBA/A for line management. Pt demonstrates good walker management throughout.      Barriers to Plan/Home: Pt with steps-will trial at future PT session(anticipate they will note be a barrier to DC home)     DC: PT rec pt discharge home with assist as needed once medically stable.

## 2017-03-05 NOTE — PLAN OF CARE
Problem: Goal Outcome Summary  Goal: Goal Outcome Summary  Outcome: No Change  Day RN  VSS, afebrile.  Pain managed with PRN oxycodone and vistaril.  CMS intact, baseline numbness to BLE.  Up with SBA and WW.  Wound vac in place to continuous suction at 75 mm Hg, minimal output, WOCN to change prior to DC tomorrow.  Tolerating diet, ISS given.  Pt complaining of sores in mouth, Dr. Ramon manjarrez.   Continues on IV Rocephin and Vanco.  Plan is to DC home tomorrow.  Will continue to monitor.

## 2017-03-05 NOTE — PROGRESS NOTES
Note Infectious Disease Consult Service Progress Note   Pt Name Jessica Rucker   Date 03/05/2017   MRN [id number]       Notes / labs / imaging test results and other data were reviewed    CHIEF COMPLAINT: REASON FOR VISIT    Swelling over surgical incision      HPI  50 yo w apparent deep C-spine infection after elective surgery     2.9  C-spine fusion  Per pt, at time surgery, no indication infection  Went home, initially all was well  Then progressive swelling over posterior neck incision     3.3  To OR. From OP note  Deep wound infection going down to the hardware and bone. Seroma complicated by necrotic purulent material present overlying the hardware and bone. Some sharp debridement needed to take out the necrotic material.          PFSH:  Personal / Family / Social Histories were reviewed and updated  Nothing  new      CURRENT MED REVIEWD    Prescription Medications as of 3/5/2017             sennosides (SENOKOT) 8.6 MG tablet Take 1 tablet by mouth daily as needed for constipation    Docusate Sodium (COLACE PO) Take 50 mg by mouth daily    HYDROcodone-acetaminophen (NORCO) 5-325 MG per tablet Take 1-2 tablets by mouth every 6 hours as needed for moderate to severe pain    liraglutide (VICTOZA) 18 MG/3ML soln Inject 1.2 mg Subcutaneous daily     TIZANIDINE HCL PO Take 4 mg by mouth every morning    TIZANIDINE HCL PO Take 8 mg by mouth At Bedtime    OMEPRAZOLE PO Take 20 mg by mouth 2 times daily (before meals)    FLUoxetine HCl (PROZAC PO) Take 20 mg by mouth daily    glimepiride (AMARYL) 2 MG tablet Take 2 mg by mouth 2 times daily    METFORMIN HCL PO Take 1,000 mg by mouth 2 times daily (with meals)    GABAPENTIN PO Take 1,200 mg by mouth 2 times daily    OXAZEPAM PO Take 10 mg by mouth 2 times daily    HYDROXYZINE HCL PO Take 25 mg by mouth 3 times daily as needed for other (anxiety)    LORAZEPAM PO Take 2 mg by mouth nightly as needed for anxiety     METOPROLOL SUCCINATE ER PO Take 25 mg by mouth daily     NORTRIPTYLINE HCL PO Take 50 mg by mouth At Bedtime     SIMVASTATIN PO Take 20 mg by mouth daily    albuterol (PROAIR HFA, PROVENTIL HFA, VENTOLIN HFA) 108 (90 BASE) MCG/ACT inhaler Inhale 2 puffs into the lungs every 6 hours as needed       Facility Administered Medications as of 3/5/2017             vancomycin 1250 mg in 0.9% NaCl 250 mL PREMIX Inject 250 mLs (1,250 mg) into the vein every 12 hours    albuterol (PROAIR HFA/PROVENTIL HFA/VENTOLIN HFA) Inhaler 2 puff Inhale 2 puffs into the lungs every 6 hours as needed for shortness of breath / dyspnea    docusate (COLACE) 50 MG/5ML liquid 50 mg Take 5 mLs (50 mg) by mouth daily    FLUoxetine (PROzac) capsule 20 mg Take 1 capsule (20 mg) by mouth daily    gabapentin (NEURONTIN) tablet 1,200 mg Take 2 tablets (1,200 mg) by mouth 2 times daily    hydrOXYzine (ATARAX) tablet 25 mg Take 1 tablet (25 mg) by mouth 3 times daily as needed for other (anxiety)    liraglutide (VICTOZA) injection 1.2 mg Inject 1.2 mg Subcutaneous daily    LORazepam (ATIVAN) tablet 2 mg Take 2 tablets (2 mg) by mouth nightly as needed for anxiety    metFORMIN (GLUCOPHAGE) tablet 1,000 mg Take 2 tablets (1,000 mg) by mouth 2 times daily (with meals)    metoprolol (TOPROL-XL) 24 hr tablet 25 mg Take 1 tablet (25 mg) by mouth daily    nortriptyline (PAMELOR) capsule 50 mg Take 2 capsules (50 mg) by mouth At Bedtime    omeprazole (priLOSEC) CR capsule 20 mg Take 1 capsule (20 mg) by mouth 2 times daily (before meals)    oxazepam (SERAX) capsule 10 mg Take 1 capsule (10 mg) by mouth 2 times daily    sennosides (SENOKOT) tablet 1 tablet Take 1 tablet by mouth daily as needed for constipation    simvastatin (ZOCOR) tablet 20 mg Take 1 tablet (20 mg) by mouth daily    tiZANidine (ZANAFLEX) tablet 4 mg Take 1 tablet (4 mg) by mouth every morning    tiZANidine (ZANAFLEX) tablet 8 mg Take 2 tablets (8 mg) by mouth At Bedtime    lidocaine 1 % 1 mL 1 mL by Other route every hour as needed (mild pain with  "VAD insertion or accessing implanted port)    lidocaine (LMX4) kit Apply topically every hour as needed for pain (with VAD insertion or accessing implanted port.)    sodium chloride (PF) 0.9% PF flush 3 mL 3 mLs by Intracatheter route every hour as needed for line flush (for peripheral IV flush post IV meds)    sodium chloride (PF) 0.9% PF flush 3 mL 3 mLs by Intracatheter route every 8 hours    naloxone (NARCAN) injection 0.1-0.4 mg Inject 0.25-1 mLs (0.1-0.4 mg) into the vein every 2 minutes as needed for opioid reversal    LORazepam (ATIVAN) injection 0.5-1 mg Inject 0.25-0.5 mLs (0.5-1 mg) into the vein every 6 hours as needed for anxiety or muscle spasms    Linked Group 1:  \"Or\" Linked Group Details     LORazepam (ATIVAN) tablet 0.5-1 mg Take 1-2 tablets (0.5-1 mg) by mouth every 6 hours as needed for anxiety or muscle spasms    Linked Group 1:  \"Or\" Linked Group Details     morphine (PF) injection 4-6 mg Inject 4-6 mg into the vein every 2 hours as needed for severe pain (or patient unable to take PO)    acetaminophen (TYLENOL) tablet 975 mg Take 3 tablets (975 mg) by mouth every 8 hours    acetaminophen (TYLENOL) tablet 650 mg Starting on 3/6/2017. Take 2 tablets (650 mg) by mouth every 4 hours as needed for other (surgical pain)    oxyCODONE (ROXICODONE) IR tablet 5-10 mg Take 1-2 tablets (5-10 mg) by mouth every 3 hours as needed for moderate to severe pain    ondansetron (ZOFRAN-ODT) ODT tab 4 mg Take 1 tablet (4 mg) by mouth every 6 hours as needed for nausea    Linked Group 2:  \"Or\" Linked Group Details     ondansetron (ZOFRAN) injection 4 mg Inject 2 mLs (4 mg) into the vein every 6 hours as needed for nausea or vomiting    Linked Group 2:  \"Or\" Linked Group Details     senna-docusate (SENOKOT-S;PERICOLACE) 8.6-50 MG per tablet 1-2 tablet Take 1-2 tablets by mouth 2 times daily    glucose 40 % gel 15-30 g Take 15-30 g by mouth every 15 minutes as needed for low blood sugar    Linked Group 3:  \"Or\" " "Linked Group Details     dextrose 50 % injection 25-50 mL Inject 25-50 mLs into the vein every 15 minutes as needed for low blood sugar    Linked Group 3:  \"Or\" Linked Group Details     glucagon injection 1 mg Inject 1 mg Subcutaneous every 15 minutes as needed for low blood sugar (May repeat x 1 only)    Linked Group 3:  \"Or\" Linked Group Details     insulin aspart (NovoLOG) inj (RAPID ACTING) Inject 1-7 Units Subcutaneous 3 times daily (before meals)    insulin aspart (NovoLOG) inj (RAPID ACTING) Inject 1-5 Units Subcutaneous At Bedtime    cefTRIAXone (ROCEPHIN) 1 g vial to attach to  mL bag for ADULTS or NS 50 mL bag for PEDS Inject 1 g into the vein every 24 hours    influenza quadrivalent (PF) vacc age 3 yrs and older (FLUZONE or Flulaval) injection 0.5 mL Inject 0.5 mLs into the muscle Prior to discharge    vancomycin (VANCOCIN) 1,250 mg in NaCl 0.9 % 250 mL intermittent infusion (Discontinued) Inject 1,250 mg into the vein every 8 hours          Vital Signs: /67  Pulse 87  Temp 97.6  F (36.4  C) (Oral)  Resp 16  Ht 1.664 m (5' 5.51\")  Wt 99.8 kg (220 lb)  SpO2 92%  BMI 36.04 kg/m2      Data  Cultures    3.3 Neck (OR) Negative  3.4 Blood  Negative      LABS  Lab Results   Component Value Date/Time    WBC 8.6 03/05/2017 05:48 AM    WBC 11.6 (H) 03/04/2017 05:59 AM    WBC 12.6 (H) 02/11/2017 06:09 AM    WBC 11.4 (H) 02/10/2017 06:15 AM       ASSESSMENT & SUGGESTIONS    M96.89 Postoperative surgical complication      Post op C-spine fluid and necrotic tissue  So far, no confirmation of specific infecting agent  ? If could be tissue necrosis w/o infection  No mention of dural tear or CSF leak in Feb 9 op note to think of CSF leak playing any role     For now, cont broad spectrum antimicrobial agents   Monitor cultures  If (+) culture, tailor antibiotic choice accordingly  If (--) culture, decide either not infection and stop ATB or culture-negative and continue long term broad spectrum antimicrobial " agents      Per Dr Gray's note, dressing change tomorrow  Final ATB recommendations tomorrow (Dr Castellanos / Dashawn or Rodrigo)       Stef Layne MD  Covering for Sallie Wallace & Jasmin & Dashawn  [facility] Infectious Diseases  [phone]

## 2017-03-05 NOTE — PLAN OF CARE
Problem: Goal Outcome Summary  Goal: Goal Outcome Summary  Outcome: Improving  One assist with walker.  Modified carb diet.  Has wound vac at 75 cont. Suction.  Problems with leaking.  Changed drape and is functioning now.  Pain controlled with Oxycodone.  CWON will see her Monday to change wound vac.

## 2017-03-05 NOTE — PLAN OF CARE
Problem: Goal Outcome Summary  Goal: Goal Outcome Summary  PT: Pt approached for session this AM. Pt sleeping at this time and requesting therapy come back later this PM. Will re-attempt as time/schedule allows.

## 2017-03-06 ENCOUNTER — APPOINTMENT (OUTPATIENT)
Dept: PHYSICAL THERAPY | Facility: CLINIC | Age: 52
DRG: 857 | End: 2017-03-06
Attending: ORTHOPAEDIC SURGERY
Payer: COMMERCIAL

## 2017-03-06 LAB
ANISOCYTOSIS BLD QL SMEAR: SLIGHT
BASOPHILS # BLD AUTO: 0 10E9/L (ref 0–0.2)
BASOPHILS NFR BLD AUTO: 0.4 %
CREAT SERPL-MCNC: 0.76 MG/DL (ref 0.52–1.04)
DIFFERENTIAL METHOD BLD: ABNORMAL
ELLIPTOCYTES BLD QL SMEAR: SLIGHT
EOSINOPHIL # BLD AUTO: 0.5 10E9/L (ref 0–0.7)
EOSINOPHIL NFR BLD AUTO: 5 %
ERYTHROCYTE [DISTWIDTH] IN BLOOD BY AUTOMATED COUNT: 15.4 % (ref 10–15)
GFR SERPL CREATININE-BSD FRML MDRD: 79 ML/MIN/1.7M2
GLUCOSE BLDC GLUCOMTR-MCNC: 139 MG/DL (ref 70–99)
GLUCOSE BLDC GLUCOMTR-MCNC: 141 MG/DL (ref 70–99)
GLUCOSE BLDC GLUCOMTR-MCNC: 143 MG/DL (ref 70–99)
GLUCOSE BLDC GLUCOMTR-MCNC: 156 MG/DL (ref 70–99)
GLUCOSE BLDC GLUCOMTR-MCNC: 204 MG/DL (ref 70–99)
HCT VFR BLD AUTO: 32.1 % (ref 35–47)
HGB BLD-MCNC: 10.1 G/DL (ref 11.7–15.7)
HYPOCHROMIA BLD QL: PRESENT
IMM GRANULOCYTES # BLD: 0 10E9/L (ref 0–0.4)
IMM GRANULOCYTES NFR BLD: 0.2 %
LYMPHOCYTES # BLD AUTO: 5.1 10E9/L (ref 0.8–5.3)
LYMPHOCYTES NFR BLD AUTO: 51.8 %
MACROCYTES BLD QL SMEAR: PRESENT
MCH RBC QN AUTO: 25.8 PG (ref 26.5–33)
MCHC RBC AUTO-ENTMCNC: 31.5 G/DL (ref 31.5–36.5)
MCV RBC AUTO: 82 FL (ref 78–100)
MICROCYTES BLD QL SMEAR: PRESENT
MONOCYTES # BLD AUTO: 0.7 10E9/L (ref 0–1.3)
MONOCYTES NFR BLD AUTO: 6.7 %
NEUTROPHILS # BLD AUTO: 3.5 10E9/L (ref 1.6–8.3)
NEUTROPHILS NFR BLD AUTO: 35.9 %
NRBC # BLD AUTO: 0 10*3/UL
NRBC BLD AUTO-RTO: 0 /100
PLATELET # BLD AUTO: 271 10E9/L (ref 150–450)
PLATELET # BLD EST: NORMAL 10*3/UL
RBC # BLD AUTO: 3.92 10E12/L (ref 3.8–5.2)
VANCOMYCIN SERPL-MCNC: 22 MG/L
WBC # BLD AUTO: 9.9 10E9/L (ref 4–11)

## 2017-03-06 PROCEDURE — 12000000 ZZH R&B MED SURG/OB

## 2017-03-06 PROCEDURE — 82565 ASSAY OF CREATININE: CPT | Performed by: ORTHOPAEDIC SURGERY

## 2017-03-06 PROCEDURE — 99232 SBSQ HOSP IP/OBS MODERATE 35: CPT | Performed by: INTERNAL MEDICINE

## 2017-03-06 PROCEDURE — 80202 ASSAY OF VANCOMYCIN: CPT | Performed by: ORTHOPAEDIC SURGERY

## 2017-03-06 PROCEDURE — 99213 OFFICE O/P EST LOW 20 MIN: CPT

## 2017-03-06 PROCEDURE — 25000132 ZZH RX MED GY IP 250 OP 250 PS 637: Performed by: INTERNAL MEDICINE

## 2017-03-06 PROCEDURE — 97116 GAIT TRAINING THERAPY: CPT | Mod: GP | Performed by: PHYSICAL THERAPY ASSISTANT

## 2017-03-06 PROCEDURE — 40000193 ZZH STATISTIC PT WARD VISIT: Performed by: PHYSICAL THERAPY ASSISTANT

## 2017-03-06 PROCEDURE — 00000146 ZZHCL STATISTIC GLUCOSE BY METER IP

## 2017-03-06 PROCEDURE — E2402 NEG PRESS WOUND THERAPY PUMP: HCPCS

## 2017-03-06 PROCEDURE — 36415 COLL VENOUS BLD VENIPUNCTURE: CPT | Performed by: ORTHOPAEDIC SURGERY

## 2017-03-06 PROCEDURE — 40000905 ZZH STATISTIC WOC PT EDUCATION, > 60 MIN

## 2017-03-06 PROCEDURE — 25000125 ZZHC RX 250: Performed by: ORTHOPAEDIC SURGERY

## 2017-03-06 PROCEDURE — 85025 COMPLETE CBC W/AUTO DIFF WBC: CPT | Performed by: ORTHOPAEDIC SURGERY

## 2017-03-06 PROCEDURE — 25000132 ZZH RX MED GY IP 250 OP 250 PS 637: Performed by: ORTHOPAEDIC SURGERY

## 2017-03-06 PROCEDURE — 97605 NEG PRS WND THER DME<=50SQCM: CPT

## 2017-03-06 RX ORDER — LIDOCAINE 40 MG/G
CREAM TOPICAL
Status: DISCONTINUED | OUTPATIENT
Start: 2017-03-06 | End: 2017-03-07 | Stop reason: CLARIF

## 2017-03-06 RX ORDER — VANCOMYCIN HYDROCHLORIDE 1 G/200ML
1000 INJECTION, SOLUTION INTRAVENOUS EVERY 12 HOURS
Status: DISCONTINUED | OUTPATIENT
Start: 2017-03-06 | End: 2017-03-07 | Stop reason: HOSPADM

## 2017-03-06 RX ORDER — HEPARIN SODIUM,PORCINE 10 UNIT/ML
2-5 VIAL (ML) INTRAVENOUS
Status: DISCONTINUED | OUTPATIENT
Start: 2017-03-06 | End: 2017-03-07 | Stop reason: CLARIF

## 2017-03-06 RX ADMIN — OXYCODONE HYDROCHLORIDE 15 MG: 5 TABLET ORAL at 14:50

## 2017-03-06 RX ADMIN — OXYCODONE HYDROCHLORIDE 15 MG: 5 TABLET ORAL at 21:16

## 2017-03-06 RX ADMIN — GABAPENTIN 1200 MG: 600 TABLET, FILM COATED ORAL at 07:36

## 2017-03-06 RX ADMIN — FLUOXETINE 20 MG: 20 CAPSULE ORAL at 07:36

## 2017-03-06 RX ADMIN — TIZANIDINE 8 MG: 4 TABLET ORAL at 21:16

## 2017-03-06 RX ADMIN — TIZANIDINE 4 MG: 4 TABLET ORAL at 07:36

## 2017-03-06 RX ADMIN — OXYCODONE HYDROCHLORIDE 15 MG: 5 TABLET ORAL at 03:51

## 2017-03-06 RX ADMIN — NORTRIPTYLINE HYDROCHLORIDE 50 MG: 25 CAPSULE ORAL at 21:16

## 2017-03-06 RX ADMIN — OXYCODONE HYDROCHLORIDE 15 MG: 5 TABLET ORAL at 07:46

## 2017-03-06 RX ADMIN — METFORMIN HYDROCHLORIDE 1000 MG: 500 TABLET ORAL at 07:36

## 2017-03-06 RX ADMIN — LIRAGLUTIDE 1.2 MG: 6 INJECTION SUBCUTANEOUS at 07:49

## 2017-03-06 RX ADMIN — ACETAMINOPHEN 975 MG: 325 TABLET, FILM COATED ORAL at 11:41

## 2017-03-06 RX ADMIN — OMEPRAZOLE 20 MG: 20 CAPSULE, DELAYED RELEASE ORAL at 06:01

## 2017-03-06 RX ADMIN — OXYCODONE HYDROCHLORIDE 15 MG: 5 TABLET ORAL at 11:03

## 2017-03-06 RX ADMIN — OXYCODONE HYDROCHLORIDE 15 MG: 5 TABLET ORAL at 00:29

## 2017-03-06 RX ADMIN — METFORMIN HYDROCHLORIDE 1000 MG: 500 TABLET ORAL at 17:48

## 2017-03-06 RX ADMIN — HYDROXYZINE HYDROCHLORIDE 25 MG: 25 TABLET ORAL at 21:19

## 2017-03-06 RX ADMIN — OMEPRAZOLE 20 MG: 20 CAPSULE, DELAYED RELEASE ORAL at 17:48

## 2017-03-06 RX ADMIN — Medication 1250 MG: at 06:03

## 2017-03-06 RX ADMIN — GABAPENTIN 1200 MG: 600 TABLET, FILM COATED ORAL at 21:17

## 2017-03-06 RX ADMIN — SENNOSIDES AND DOCUSATE SODIUM 2 TABLET: 8.6; 5 TABLET ORAL at 07:36

## 2017-03-06 RX ADMIN — LORAZEPAM 2 MG: 1 TABLET ORAL at 21:16

## 2017-03-06 RX ADMIN — METOPROLOL SUCCINATE 25 MG: 25 TABLET, EXTENDED RELEASE ORAL at 07:36

## 2017-03-06 RX ADMIN — VANCOMYCIN HYDROCHLORIDE 1000 MG: 1 INJECTION, SOLUTION INTRAVENOUS at 17:47

## 2017-03-06 RX ADMIN — ACETAMINOPHEN 975 MG: 325 TABLET, FILM COATED ORAL at 03:51

## 2017-03-06 RX ADMIN — SENNOSIDES AND DOCUSATE SODIUM 2 TABLET: 8.6; 5 TABLET ORAL at 21:17

## 2017-03-06 RX ADMIN — SIMVASTATIN 20 MG: 20 TABLET, FILM COATED ORAL at 07:36

## 2017-03-06 RX ADMIN — OXYCODONE HYDROCHLORIDE 15 MG: 5 TABLET ORAL at 17:47

## 2017-03-06 NOTE — PLAN OF CARE
Problem: Goal Outcome Summary  Goal: Goal Outcome Summary  Outcome: No Change  Patient SBA with walker.  Carb controlled diet.  Has Oxycodone for pain.  Also has vistaril, ativan, and xanaflex.  New IV put in.  Rocephin late due to IV infiltration.

## 2017-03-06 NOTE — PLAN OF CARE
Problem: Goal Outcome Summary  Goal: Goal Outcome Summary  Surgeon Discharge Plan: None documented-pt anticipates home     Current Functional Status: Pt is indep with sit to/from stand and bed to bed transfers. Pt amb 130' with ww with supervision with IV pole for wound vac. Pt performed 4 steps with one rail with side stepping up and down with supervision.      Barriers to Plan/Home: steps with wound vac

## 2017-03-06 NOTE — PROGRESS NOTES
"[facility]  Hospitalist Progress Note  Elisa Navarro MD 03/05/2017    Reason for Stay (Diagnosis): increasing pain and swelling posterior neck         Assessment and Plan:      Summary of Stay: Jessica Rucker is a 51 year old female admitted on 3/3/2017    Problem List:   1. Deep wound infection posterior neck   after posterior spinal fusion( 2/9)  S/p wound debridement and irrigation 3/3  And with wound vac  Hardware was not removed  Culture have not been positive  Suspect MRSA   Being treated with vanco and ceftriaxone  Expect will need at least 4 weeks IV vancomycin  May need more surgical debridement   Wound vac to be removed tomorrow and changed    2. Diabetes and hypertension  Continue home regiment with glimepiride,metformin and victoza  And HCTZ    3.chronic kidney disease stable        Interval History (Subjective):        \" I am getting less pain\"             Physical Exam:      Last Vital Signs:  /74 (BP Location: Right arm)  Pulse 87  Temp 99.3  F (37.4  C) (Oral)  Resp 18  Ht 1.664 m (5' 5.51\")  Wt 99.8 kg (220 lb)  SpO2 97%  BMI 36.04 kg/m2    Constitutional: wake, alert, cooperative, no apparent distress   Respiratory: Clear to auscultation bilaterally, no rales or wheezing   Cardiovascular: Regular rate and rhythm, normal S1 and S2, and no murmur noted   Abdomen: Normal bowel sounds, soft, non-distended, non-tender   Skin: No rashes, no cyanosis, dry to touch   Neuro: Alert and oriented x3, no weakness, numbness, memory loss   Extremities: No edema, normal range of motion   Other(s): Posterior neck tenderness wound vac in place  Pain control with oxycodone 10 to 15 mg q 3 hours    C/o tenderness inner upper and lower right mouth mucosa area is red and mildly swollen but no exudate and no ulcer    Able to walk in room and to restroom   All other systems: Negative          Medications:      All current medications were reviewed with changes reflected in problem list.        "  Data:      All new lab and imaging data was reviewed.   Labs: Na 140  K 3.6  Creat 0.78  Wbc 8.6  Hemoglobin 9.1  Glucose 188  Imaging: none recent

## 2017-03-06 NOTE — CONSULTS
SWS     D: Per identification in rounds for assist with discharge planning. Chart reviewed noting pt's admit on 3/3 due to wound infection status post cervical fusion, noted I&D, ID consulting. Based on information noted to SW during rounds this morning it was noted that pt may be needing IV antibiotics on discharge. Per protocol, insurance benefit was done through [facility] (I), per Julianne @ [facility] pt would have 100% coverage for home IV antibiotics through her [employer] [facility].     A/P: Specifics of discharge needs undetermined at this time, SW following.

## 2017-03-06 NOTE — PLAN OF CARE
Problem: Goal Outcome Summary  Goal: Goal Outcome Summary  Outcome: Improving  Wound vac changed this AM by WOC nurse. Continuous suction at 75. Baseline numbness on extremities. Pain on posterior neck and bilateral shoulder managed with prn oxycodone. SBA and gait belt for mobility. Lightheadedness with mobility. Voiding. Awaiting for final cultures for antibiotic decisions to discharge with. Afebrile. Vss.

## 2017-03-06 NOTE — PROGRESS NOTES
"[facility]    Spinal Surgery Progress Note     Assessment & Plan   Jessica Rucker is a 51 year old female who was admitted on 3/3/2017 for an incision and drainage of cervical spinal wound infection (seroma with necrotic purulent material noted).  Ms. Rucker is POD # 3, awaiting wound culture results to determine extended antibiotics, ID consulted to assist, thank you, and wound vac dressing changed today by wound RN, thank you, which will be changed every MWF.      Neuro/pain: Aspen collar, scheduled APAP, PRN oxycodone  ID: ID consulted, vanco/rocephin scheduled, wound cultures pending, blood cultures NGTD, contact precautions  Resp: C&DB, IS  GI: scheduled senna, consistent carb diet  MS: up with assist  Skin: posterior cervical wound vac to 75 mmHg continuous suction, wound vac dressing change every MWF, observed wound vac dressing change today by wound RN    Gurpreet Holloway, APRN, CNP    Interval History   \"My pain stays at a 7-9, a throbbing pain right in the incision.\"  I observed the wound RN changed the wound vac, no purulent drainage noted.    Physical Exam   Temp: 98.3  F (36.8  C) Temp src: Oral BP: 128/81 Pulse: 98 Heart Rate: 93 Resp: 20 SpO2: 98 % O2 Device: None (Room air)    Vitals:    03/03/17 1331   Weight: 99.8 kg (220 lb)     Vital Signs with Ranges  Temp:  [98.1  F (36.7  C)-99.3  F (37.4  C)] 98.3  F (36.8  C)  Pulse:  [96-98] 98  Heart Rate:  [93] 93  Resp:  [10-20] 20  BP: ()/(40-81) 128/81  SpO2:  [93 %-98 %] 98 %  I/O last 3 completed shifts:  In: 1060 [P.O.:1060]  Out: 25 [Drains:25]    Constitutional: awake, alert, cooperative, no apparent distress, and appears stated age  Respiratory: No increased work of breathing, good air exchange, clear to auscultation bilaterally, no crackles or wheezing  Cardiovascular: Normal apical impulse, regular rate and rhythm, normal S1 and S2  GI: Normal bowel sounds, soft, non-distended  Skin: posterior cervical wound vac to 75 mmHg " continuous suction  Musculoskeletal: full range of motion noted  motor strength is 4.5 out of 5 all extremities bilaterally  Neurologic: Mental Status Exam:  Level of Alertness:   awake  Orientation:   person, place, time    Medications        vancomycin (VANCOCIN) IV  1,000 mg Intravenous Q12H     cefTRIAXone  2 g Intravenous Q24H     LORazepam (ATIVAN) tablet 2 mg  2 mg Oral At Bedtime     docusate  50 mg Oral Daily     FLUoxetine (PROzac) capsule 20 mg  20 mg Oral Daily     gabapentin (NEURONTIN) tablet 1,200 mg  1,200 mg Oral BID     liraglutide  1.2 mg Subcutaneous Daily     metFORMIN (GLUCOPHAGE) tablet 1,000 mg  1,000 mg Oral BID w/meals     metoprolol (TOPROL-XL) 24 hr tablet 25 mg  25 mg Oral Daily     nortriptyline (PAMELOR) capsule 50 mg  50 mg Oral At Bedtime     omeprazole (priLOSEC) CR capsule 20 mg  20 mg Oral BID AC     oxazepam  10 mg Oral BID     simvastatin (ZOCOR) tablet 20 mg  20 mg Oral Daily     tiZANidine (ZANAFLEX) tablet 4 mg  4 mg Oral QAM     tiZANidine (ZANAFLEX) tablet 8 mg  8 mg Oral At Bedtime     sodium chloride (PF)  3 mL Intracatheter Q8H     senna-docusate  1-2 tablet Oral BID     insulin aspart  1-7 Units Subcutaneous TID AC     insulin aspart  1-5 Units Subcutaneous At Bedtime     influenza quadrivalent (PF) vacc age 3 yrs and older  0.5 mL Intramuscular Prior to discharge       Data   Results for orders placed or performed during the hospital encounter of 03/03/17 (from the past 24 hour(s))   Glucose by meter   Result Value Ref Range    Glucose 188 (H) 70 - 99 mg/dL   Glucose by meter   Result Value Ref Range    Glucose 144 (H) 70 - 99 mg/dL   Glucose by meter   Result Value Ref Range    Glucose 141 (H) 70 - 99 mg/dL   Vancomycin level   Result Value Ref Range    Vancomycin Level 22.0 mg/L   CBC with platelets differential   Result Value Ref Range    WBC 9.9 4.0 - 11.0 10e9/L    RBC Count 3.92 3.8 - 5.2 10e12/L    Hemoglobin 10.1 (L) 11.7 - 15.7 g/dL    Hematocrit 32.1 (L) 35.0  - 47.0 %    MCV 82 78 - 100 fl    MCH 25.8 (L) 26.5 - 33.0 pg    MCHC 31.5 31.5 - 36.5 g/dL    RDW 15.4 (H) 10.0 - 15.0 %    Platelet Count 271 150 - 450 10e9/L    Diff Method Automated Method     % Neutrophils 35.9 %    % Lymphocytes 51.8 %    % Monocytes 6.7 %    % Eosinophils 5.0 %    % Basophils 0.4 %    % Immature Granulocytes 0.2 %    Nucleated RBCs 0 0 /100    Absolute Neutrophil 3.5 1.6 - 8.3 10e9/L    Absolute Lymphocytes 5.1 0.8 - 5.3 10e9/L    Absolute Monocytes 0.7 0.0 - 1.3 10e9/L    Absolute Eosinophils 0.5 0.0 - 0.7 10e9/L    Absolute Basophils 0.0 0.0 - 0.2 10e9/L    Abs Immature Granulocytes 0.0 0 - 0.4 10e9/L    Absolute Nucleated RBC 0.0     Anisocytosis Slight     Elliptocytes Slight     Microcytes Present     Macrocytes Present     Hypochromasia Present     Platelet Estimate Normal    Creatinine   Result Value Ref Range    Creatinine 0.76 0.52 - 1.04 mg/dL    GFR Estimate 79 >60 mL/min/1.7m2    GFR Estimate If Black >90   GFR Calc   >60 mL/min/1.7m2   Glucose by meter   Result Value Ref Range    Glucose 204 (H) 70 - 99 mg/dL   Glucose by meter   Result Value Ref Range    Glucose 143 (H) 70 - 99 mg/dL

## 2017-03-06 NOTE — PLAN OF CARE
Problem: Laminectomy/Foraminotomy/Discectomy (Adult)  Goal: Signs and Symptoms of Listed Potential Problems Will be Absent or Manageable (Laminectomy/Foraminotomy/Discectomy)  Signs and symptoms of listed potential problems will be absent or manageable by discharge/transition of care (reference Laminectomy/Foraminotomy/Discectomy (Adult) CPG).   Outcome: Improving  A&O x4, Had BP 90/40 but otherwise VSS. LS CTA all fields, BS active x4. Wound vac to neck is CDI and set at 75mmHg continuous with minimal output. Cervical brace on at all times, skin is intact. Numbness to BLE and R arm at baseline, otherwise CMS intact. dee dee PO well, up with A1 and walker, PO oxycodone managing pain, voiding in good amts, plans to dc to home after cultures have been resulted to determine if PICC placement is necessary.

## 2017-03-06 NOTE — PROGRESS NOTES
Infection Prevention:    Patient requires Contact precautions because of MRSA: per H & P, unknown site/date. Please contact Infection Prevention with any questions/concerns at *[phone].    Jeanine Chilel, ICP

## 2017-03-06 NOTE — PROGRESS NOTES
"[facility]  Infectious Disease Progress Note          Assessment and Plan:   IMP 1 52 yo female SP cervical hardware fusion, early postop infection CN staph in cx,SP I and D , hardware in, no sig systemic toxicity  2 DM    REC 1 PICC, continue same , await final cx, if CN staph likely vanco, po Rif 6 weeks   2 Get baseline inflamm markers  3 Discussed in detail        Interval History:   no new complaints and doing well; no cp, sob, n/v/d, or abd pain. Wd stable , cx CN staph called lab will do sens, no fever reviewed all              Medications:       vancomycin (VANCOCIN) IV  1,000 mg Intravenous Q12H     cefTRIAXone  2 g Intravenous Q24H     LORazepam (ATIVAN) tablet 2 mg  2 mg Oral At Bedtime     docusate  50 mg Oral Daily     FLUoxetine (PROzac) capsule 20 mg  20 mg Oral Daily     gabapentin (NEURONTIN) tablet 1,200 mg  1,200 mg Oral BID     liraglutide  1.2 mg Subcutaneous Daily     metFORMIN (GLUCOPHAGE) tablet 1,000 mg  1,000 mg Oral BID w/meals     metoprolol (TOPROL-XL) 24 hr tablet 25 mg  25 mg Oral Daily     nortriptyline (PAMELOR) capsule 50 mg  50 mg Oral At Bedtime     omeprazole (priLOSEC) CR capsule 20 mg  20 mg Oral BID AC     oxazepam  10 mg Oral BID     simvastatin (ZOCOR) tablet 20 mg  20 mg Oral Daily     tiZANidine (ZANAFLEX) tablet 4 mg  4 mg Oral QAM     tiZANidine (ZANAFLEX) tablet 8 mg  8 mg Oral At Bedtime     sodium chloride (PF)  3 mL Intracatheter Q8H     senna-docusate  1-2 tablet Oral BID     insulin aspart  1-7 Units Subcutaneous TID AC     insulin aspart  1-5 Units Subcutaneous At Bedtime     influenza quadrivalent (PF) vacc age 3 yrs and older  0.5 mL Intramuscular Prior to discharge                  Physical Exam:   Blood pressure 100/65, pulse 98, temperature 98  F (36.7  C), temperature source Oral, resp. rate 16, height 1.664 m (5' 5.51\"), weight 99.8 kg (220 lb), SpO2 98 %.  Wt Readings from Last 2 Encounters:   03/03/17 99.8 kg (220 lb)   02/09/17 99 " kg (218 lb 3.2 oz)     Vital Signs with Ranges  Temp:  [98  F (36.7  C)-98.3  F (36.8  C)] 98  F (36.7  C)  Pulse:  [96-98] 98  Heart Rate:  [78-93] 78  Resp:  [10-20] 16  BP: ()/(40-81) 100/65  SpO2:  [93 %-98 %] 98 %    Constitutional: Awake, alert, cooperative, no apparent distress   Lungs: Clear to auscultation bilaterally, no crackles or wheezing   Cardiovascular: Regular rate and rhythm, normal S1 and S2, and no murmur noted   Abdomen: Normal bowel sounds, soft, non-distended, non-tender   Skin: No rashes, no cyanosis, no edema   Other: Wd Ok          Data:   All microbiology laboratory data reviewed.  Recent Labs   Lab Test  03/06/17   0508  03/05/17   0548  03/04/17   0559   WBC  9.9  8.6  11.6*   HGB  10.1*  9.1*  9.8*   HCT  32.1*  28.5*  30.8*   MCV  82  81  81   PLT  271  249  311     Recent Labs   Lab Test  03/06/17   0508  03/05/17   0548  03/04/17   0559   CR  0.76  0.78  1.25*     No lab results found.  Recent Labs   Lab Test  03/04/17   2225  03/03/17   1525  03/03/17   1510  03/03/17   1445   CULT  No growth after 1 day  On day 2, isolated in broth only: Coagulase negative Staphylococcus  Susceptibility testing done on previous specimen not isolated or reported on   routine culture  Culture in progress  *  No growth  Light growth Coagulase negative Staphylococcus not isolated or reported on   routine culture  Culture in progress  Susceptibility testing requested by DR. MAN 3.6.2017 @ 1400 SG  *  No growth  Moderate growth Staphylococcus aureus NOT MRSA*

## 2017-03-06 NOTE — PROGRESS NOTES
[facility]  WO Nurse Inpatient Wound Assessment-KCI VAC Dressing changes     Initial Assessment of wound(s) on pt's:   Posterior Cervical        Data:   Patient History:      per MD note(s): 51 year old female who was admitted on 3/3/2017 for an incision and drainage of cervical spinal wound infection (seroma with necrotic purulent material noted). Ms. Rucker is POD # 3, awaiting wound culture results to determine extended antibiotics, ID consulted to assist, thank you, and wound vac dressing changed today by wound RN, thank you, which will be changed every MWF.   WO nurse consulted for wound VAC dressing change. VAC paperwork completed and approved by [facility] for Home VAC Ready Care pump.       Moisture Management:  na    Catheter secured? na    Current Diet / Nutrition:           Active Diet Order      Advance Diet as Tolerated: Regular Diet Adult; 9256-0399 Calories: Moderate Consistent CHO (4-6 CHO units/meal)           Discussed improving protein intake for wound healing    Michael Assessment and sub scores:   Michael Score  Av.7  Min: 19  Max: 20     Labs:                                                                       Recent Labs   Lab Test  17   0508   02/10/17   0615   14   1755   HGB  10.1*   < >  11.8   < >  13.0   RBC  3.92   < >  4.56   < >  4.52   WBC  9.9   < >  11.4*   < >  10.7   PLT  271   < >  261   < >  261   INR   --    --    --    --   1.01   A1C   --    --   10.6*   < >   --     < > = values in this interval not displayed.              Wound Assessment (location #1):   Posterior Cervical spine  Wound History:  See above,     Age of wound/ surgical date: I&D with VAC placed 3/3/17    Date KCI VAC placed: 3/3      KCI Wound VAC initiated by:  [facility] Nurse: No  MD: Yes    Any other wound therapies tried prior to KCI VAC placed? No    Specific Dimensions (length x width x depth, in cm) :   9 x 6 x 6cm    Tunneling:  Not explored due to hardware and potential damage        Undermining: na    Wound Base: moist and granulation beefy red tissue at sides of wound, cervical bone at mid base with connective tissue loosely around wound base. Unable to fully explore due to potential for possible damage to hardware and wound base    Palpation of the wound bed:  firm over bone, normal to rest of wound    Slough appearance:  none         Eschar appearance:  none    Periwound Skin: intact with small self inflicted traumatic injury noted at 7 o'clock ~ 1 x 1.5 x0.2cm pink dermis, tattoo, outer edges of old tape line is red and inflamed  ,      Drainage:  . Amount: light . Color: serosanguinous     Odor: none    Pain:  minimal , aching    Was patient premedicated prior to dressing change? Yes    Medication(s) used:  See MAR, pt tolerated very well and was able to converse thru entire procedure          Intervention:     Patient's chart evaluated.      Wound was assessed with CNP Spinal team.    Wound Care: was done:  Wound VAC applied, cleansed wound, No sting, picture framed, 1 pc adaptic to wound base and 1 piece of Black foam cut in roll fashion to fill the large wound base and ribbon up. TRAC pad to center with hose off towards Left side (pt sleeps on right side), NPWT -75 mmHg without leaks. 45min assessment and dressing with NP    Orders  Written per Dr Gray's instructions    Supplies  Ordered: Small Black foam    Discussed plan of care with Patient, Family, Nurse, Cm and Nurse Practitioner.     Education with pt and SO on Home VAC unit- 30min    Wound VAC paperwork completed- 45 min          Assessment:       Surgical wound appropriate for wound VAC, hardware and bone at wound base will make this difficult to heal especially in light of the co-morbidities (diabetes) and presence of infection.          Plan:     Nursing to notify the Provider(s) and re-consult the [facility] Nurse if wound(s) deteriorate(s).    Plan for care of wound located on posterior Cervical: M-W-F wound VAC NPWT  -75mmHg    WOC Nurse will return: Wednesday     Face to face time: > 60 Minutes see above

## 2017-03-06 NOTE — PHARMACY-VANCOMYCIN DOSING SERVICE
Pharmacy Vancomycin Note  Date of Service 2017  Patient's  1965   51 year old, female    Indication: Sepsis  Goal Trough Level: 15-20 mg/L  Day of Therapy: 4  Current Vancomycin regimen:  1250 mg IV q12h    Current estimated CrCl = Estimated Creatinine Clearance: 102.2 mL/min (based on Cr of 0.76).    Creatinine for last 3 days  3/4/2017:  5:59 AM Creatinine 1.25 mg/dL  3/5/2017:  5:48 AM Creatinine 0.78 mg/dL  3/6/2017:  5:08 AM Creatinine 0.76 mg/dL    Recent Vancomycin Levels (past 3 days)  3/4/2017:  3:10 PM Vancomycin Level 31.2 mg/L  3/6/2017:  5:08 AM Vancomycin Level 22.0 mg/L    Vancomycin IV Administrations (past 72 hours)                   vancomycin 1250 mg in 0.9% NaCl 250 mL PREMIX (mg) 1,250 mg New Bag 17 0603     1,250 mg New Bag 17 1815     1,250 mg New Bag  0553    vancomycin (VANCOCIN) 1,250 mg in NaCl 0.9 % 250 mL intermittent infusion (mg) 1,250 mg New Bag 17 1555     1,250 mg New Bag  0819     1,250 mg New Bag 17 2355                Nephrotoxins and other renal medications (Future)    Start     Dose/Rate Route Frequency Ordered Stop    17 0600  vancomycin 1250 mg in 0.9% NaCl 250 mL PREMIX      1,250 mg Intravenous EVERY 12 HOURS 17 1653               Contrast Orders - past 72 hours     None          Interpretation of levels and current regimen:  Trough level is  Supratherapeutic    Has serum creatinine changed > 50% in last 72 hours: No    Urine output:  unable to determine    Renal Function: Stable    Plan:  1.  Decrease Dose to 1000mg q12h  2.  Pharmacy will check trough levels as appropriate in 1-3 Days.    3. Serum creatinine levels will be ordered daily for the first week of therapy and at least twice weekly for subsequent weeks.      Stanley Bhatti        .

## 2017-03-07 VITALS
WEIGHT: 220 LBS | TEMPERATURE: 98.1 F | SYSTOLIC BLOOD PRESSURE: 130 MMHG | HEART RATE: 97 BPM | OXYGEN SATURATION: 97 % | RESPIRATION RATE: 16 BRPM | DIASTOLIC BLOOD PRESSURE: 88 MMHG | BODY MASS INDEX: 35.36 KG/M2 | HEIGHT: 66 IN

## 2017-03-07 LAB
CREAT SERPL-MCNC: 0.83 MG/DL (ref 0.52–1.04)
CRP SERPL-MCNC: 11.3 MG/L (ref 0–8)
ERYTHROCYTE [SEDIMENTATION RATE] IN BLOOD BY WESTERGREN METHOD: 88 MM/H (ref 0–30)
GFR SERPL CREATININE-BSD FRML MDRD: 72 ML/MIN/1.7M2
GLUCOSE BLDC GLUCOMTR-MCNC: 147 MG/DL (ref 70–99)
GLUCOSE BLDC GLUCOMTR-MCNC: 158 MG/DL (ref 70–99)
GLUCOSE BLDC GLUCOMTR-MCNC: 173 MG/DL (ref 70–99)

## 2017-03-07 PROCEDURE — 27210209 ZZH KIT VALVED SINGLE LUMEN

## 2017-03-07 PROCEDURE — 25000128 H RX IP 250 OP 636: Performed by: INTERNAL MEDICINE

## 2017-03-07 PROCEDURE — 25000132 ZZH RX MED GY IP 250 OP 250 PS 637: Performed by: INTERNAL MEDICINE

## 2017-03-07 PROCEDURE — 99212 OFFICE O/P EST SF 10 MIN: CPT

## 2017-03-07 PROCEDURE — 36569 INSJ PICC 5 YR+ W/O IMAGING: CPT

## 2017-03-07 PROCEDURE — 00000146 ZZHCL STATISTIC GLUCOSE BY METER IP

## 2017-03-07 PROCEDURE — 25000128 H RX IP 250 OP 636: Performed by: ORTHOPAEDIC SURGERY

## 2017-03-07 PROCEDURE — 86140 C-REACTIVE PROTEIN: CPT | Performed by: ORTHOPAEDIC SURGERY

## 2017-03-07 PROCEDURE — 85652 RBC SED RATE AUTOMATED: CPT | Performed by: ORTHOPAEDIC SURGERY

## 2017-03-07 PROCEDURE — 36415 COLL VENOUS BLD VENIPUNCTURE: CPT | Performed by: ORTHOPAEDIC SURGERY

## 2017-03-07 PROCEDURE — 82565 ASSAY OF CREATININE: CPT | Performed by: ORTHOPAEDIC SURGERY

## 2017-03-07 PROCEDURE — 25000132 ZZH RX MED GY IP 250 OP 250 PS 637: Performed by: ORTHOPAEDIC SURGERY

## 2017-03-07 PROCEDURE — E2402 NEG PRESS WOUND THERAPY PUMP: HCPCS

## 2017-03-07 PROCEDURE — 25000125 ZZHC RX 250: Performed by: ORTHOPAEDIC SURGERY

## 2017-03-07 PROCEDURE — 90686 IIV4 VACC NO PRSV 0.5 ML IM: CPT | Performed by: ORTHOPAEDIC SURGERY

## 2017-03-07 RX ORDER — HYDROCODONE BITARTRATE AND ACETAMINOPHEN 5; 325 MG/1; MG/1
1-2 TABLET ORAL EVERY 6 HOURS PRN
Qty: 30 TABLET | Refills: 0 | Status: SHIPPED | OUTPATIENT
Start: 2017-03-07 | End: 2017-03-07

## 2017-03-07 RX ORDER — RIFAMPIN 300 MG/1
300 CAPSULE ORAL EVERY 12 HOURS
Qty: 120 CAPSULE | Refills: 3 | Status: SHIPPED | OUTPATIENT
Start: 2017-03-07

## 2017-03-07 RX ORDER — ACETAMINOPHEN 325 MG/1
975 TABLET ORAL EVERY 8 HOURS
Qty: 300 TABLET | Refills: 1 | Status: SHIPPED | OUTPATIENT
Start: 2017-03-07

## 2017-03-07 RX ORDER — RIFAMPIN 150 MG/1
300 CAPSULE ORAL EVERY 12 HOURS SCHEDULED
Status: DISCONTINUED | OUTPATIENT
Start: 2017-03-07 | End: 2017-03-07 | Stop reason: HOSPADM

## 2017-03-07 RX ORDER — OXYCODONE HYDROCHLORIDE 10 MG/1
15 TABLET ORAL EVERY 4 HOURS PRN
Qty: 180 TABLET | Refills: 0 | Status: SHIPPED | OUTPATIENT
Start: 2017-03-07

## 2017-03-07 RX ADMIN — TIZANIDINE 4 MG: 4 TABLET ORAL at 08:14

## 2017-03-07 RX ADMIN — OXYCODONE HYDROCHLORIDE 15 MG: 5 TABLET ORAL at 05:48

## 2017-03-07 RX ADMIN — VANCOMYCIN HYDROCHLORIDE 1000 MG: 1 INJECTION, SOLUTION INTRAVENOUS at 05:49

## 2017-03-07 RX ADMIN — OXYCODONE HYDROCHLORIDE 15 MG: 5 TABLET ORAL at 12:35

## 2017-03-07 RX ADMIN — OXYCODONE HYDROCHLORIDE 15 MG: 5 TABLET ORAL at 00:24

## 2017-03-07 RX ADMIN — OMEPRAZOLE 20 MG: 20 CAPSULE, DELAYED RELEASE ORAL at 05:49

## 2017-03-07 RX ADMIN — FLUOXETINE 20 MG: 20 CAPSULE ORAL at 08:13

## 2017-03-07 RX ADMIN — HYDROXYZINE HYDROCHLORIDE 25 MG: 25 TABLET ORAL at 08:13

## 2017-03-07 RX ADMIN — SIMVASTATIN 20 MG: 20 TABLET, FILM COATED ORAL at 08:14

## 2017-03-07 RX ADMIN — CEFTRIAXONE 2 G: 2 INJECTION, POWDER, FOR SOLUTION INTRAMUSCULAR; INTRAVENOUS at 00:25

## 2017-03-07 RX ADMIN — RIFAMPIN 300 MG: 150 CAPSULE ORAL at 11:46

## 2017-03-07 RX ADMIN — GABAPENTIN 1200 MG: 600 TABLET, FILM COATED ORAL at 08:13

## 2017-03-07 RX ADMIN — INFLUENZA A VIRUS A/CALIFORNIA/7/2009 X-179A (H1N1) ANTIGEN (FORMALDEHYDE INACTIVATED), INFLUENZA A VIRUS A/HONG KONG/4801/2014 X-263B (H3N2) ANTIGEN (FORMALDEHYDE INACTIVATED), INFLUENZA B VIRUS B/PHUKET/3073/2013 ANTIGEN (FORMALDEHYDE INACTIVATED), AND INFLUENZA B VIRUS B/BRISBANE/60/2008 ANTIGEN (FORMALDEHYDE INACTIVATED) 0.5 ML: 15; 15; 15; 15 INJECTION, SUSPENSION INTRAMUSCULAR at 14:57

## 2017-03-07 RX ADMIN — LIRAGLUTIDE 1.2 MG: 6 INJECTION SUBCUTANEOUS at 08:14

## 2017-03-07 RX ADMIN — METFORMIN HYDROCHLORIDE 1000 MG: 500 TABLET ORAL at 08:13

## 2017-03-07 RX ADMIN — METOPROLOL SUCCINATE 25 MG: 25 TABLET, EXTENDED RELEASE ORAL at 08:13

## 2017-03-07 NOTE — PLAN OF CARE
Problem: Goal Outcome Summary  Goal: Goal Outcome Summary  Outcome: No Change  Evening RN  VSS, afebrile.  Pain managed with PRN oxycodone 15 mg.  Up with SBA ambulating to restroom.  Baseline numbness to BLE and R hand.  Dressing CDI, WOCN changed dressing today.  Voiding adequately.  Tolerating diet, ISS given.  Wound vac in place, continues suction at 75 mm Hg, minimal output.  PICC to be placed tomorrow.  IV Rocephin and Vanco. Wound cultures pending.  Will continue to monitor.

## 2017-03-07 NOTE — CONSULTS
Care Transitions Team: Following for CC, discharge planning, and disposition.        Per chart review identified pt medical readiness for discharge,per SPINE/ID documentation.     Update provided to [facility] regarding likely of DC today.  Update provided that WOUND VAC management will be needed do well.     Pt currently getting PICC line.  Will continue to follow and coordinate discharge with interdisciplinary team and pt.      Tere Dailey RN BSN [facility]  Care Transitions Team  [phone]

## 2017-03-07 NOTE — PLAN OF CARE
Problem: Goal Outcome Summary  Goal: Goal Outcome Summary  Outcome: Improving  VS stable, afebrile. Denies nausea, tolerates mod carb diet.  and 147 this shift. Dressing dry and intact. Wound vac in place, pt will be going home with the wound vac running at 75 ml/hr. Pt started on po antibiotic and will be getting iv Vanco. PICC started today. SW is following on setting up the home infusion. Pain is managed with oxy 15 mg. Family at the bedside. Cleared by Dr Gray and ID for the pt to dc. Continue to monitor.

## 2017-03-07 NOTE — PROGRESS NOTES
Care Transitions Team: Following for CC, discharge planning, and disposition.      [facility] will be in contact with pt for home visit this evening for next vancyo dose.   [facility] will be servicing WOD VAC needs in home.  [facility] brochure with number has been provided to pt.     Explained to pt that next step will be for [facility] to contact pt via provided mobile number on face sheet, pt affirms this number as correct, to schedule evening dose, this was confirmed by JARROD Haney.     Currently awaiting Hospital discharge information.     Tere Dailey RN BSN [facility]  Care Transitions Team  [phone]'

## 2017-03-07 NOTE — PROGRESS NOTES
"[facility]  Hospitalist Progress Note  Elisa Navarro MD 03/06/2017    Reason for Stay (Diagnosis): increasing pain and swelling posterior neck         Assessment and Plan:      Summary of Stay: Jessica Rucker is a 51 year old female admitted on 3/3/2017    Problem List:   1. Deep wound infection posterior neck   after posterior spinal fusion( 2/9)  S/p wound debridement and irrigation 3/3  And with wound vac ( wound vac changed to day)  Hardware was not removed  Culture positive for light growth staph aureus coagulase negative  Suspect MRSA   PLAN  Being treated with vanco and ceftriaxone  Expect will need at least 4 weeks IV vancomycin  Planning to proceed with PICC and outpatient IV antibiotics  Still concerned about increased tenderness to spine and surrounding area below the debridement site  Will check with ortho about further study    2. Diabetes and hypertension  Continue home regiment with glimepiride,metformin and victoza  And HCTZ    3.chronic kidney disease stable        Interval History (Subjective):        \" It is very tender in the spine area below and to the sides of the wound vac\"\"             Physical Exam:      Last Vital Signs:  /65 (BP Location: Left arm)  Pulse 98  Temp 98  F (36.7  C) (Oral)  Resp 16  Ht 1.664 m (5' 5.51\")  Wt 99.8 kg (220 lb)  SpO2 98%  BMI 36.04 kg/m2    Constitutional: awake, alert, cooperative, no apparent distress   Respiratory: Clear to auscultation bilaterally, no rales or wheezing   Cardiovascular: Regular rate and rhythm, normal S1 and S2, and no murmur noted   Abdomen: Normal bowel sounds, soft, non-distended, non-tender   Skin: No rashes, no cyanosis, dry to touch   Neuro: Alert and oriented x3, no weakness, numbness, memory loss   Extremities: No edema, normal range of motion   Other(s): Posterior neck tenderness wound vac in place  Pain control with oxycodone 10 to 15 mg q 3 hours  Today increased discomfort over the spine in the thorax " and left lateral back    C/o tenderness inner upper and lower right mouth mucosa area is red and mildly swollen but no exudate and no ulcer    Able to walk in room and to restroom   All other systems: Negative          Medications:      All current medications were reviewed with changes reflected in problem list.         Data:      All new lab and imaging data was reviewed.   Labs: Na 140  K 3.6  Creat 0.78  Wbc 9.9  Hemoglobin 10.1  Culture light growth staph aureus coagulase negative  Glucose 188  Imaging: none recent

## 2017-03-07 NOTE — PROGRESS NOTES
[facility]  Infectious Disease Progress Note          Assessment and Plan:   IMP 1 52 yo female SP cervical hardware fusion, early postop infection CN staph in cx,SP I and D , hardware in, no sig systemic toxicity  2 DM  3 Hx MRSA colonization, nares neg recently    REC 1 PICC, continue vanco Dc ceftriaxone , add  po Rif is sens, 6 weeks both IV orders in  2 Note baseline inflamm markers, follow  3 Discussed in detail, see me in 4-5 weeks very likely extemnded po at that point        Interval History:   no new complaints and doing well; no cp, sob, n/v/d, or abd pain. Wd stable , cx CN staph called lab will do sens and add dapto in case needed is vanco 1 and rif sens, no fever               Medications:       rifampin  300 mg Oral Q12H GOGO     vancomycin (VANCOCIN) IV  1,000 mg Intravenous Q12H     sodium chloride (PF)  10 mL Intracatheter Q7 Days     LORazepam (ATIVAN) tablet 2 mg  2 mg Oral At Bedtime     docusate  50 mg Oral Daily     FLUoxetine (PROzac) capsule 20 mg  20 mg Oral Daily     gabapentin (NEURONTIN) tablet 1,200 mg  1,200 mg Oral BID     liraglutide  1.2 mg Subcutaneous Daily     metFORMIN (GLUCOPHAGE) tablet 1,000 mg  1,000 mg Oral BID w/meals     metoprolol (TOPROL-XL) 24 hr tablet 25 mg  25 mg Oral Daily     nortriptyline (PAMELOR) capsule 50 mg  50 mg Oral At Bedtime     omeprazole (priLOSEC) CR capsule 20 mg  20 mg Oral BID AC     oxazepam  10 mg Oral BID     simvastatin (ZOCOR) tablet 20 mg  20 mg Oral Daily     tiZANidine (ZANAFLEX) tablet 4 mg  4 mg Oral QAM     tiZANidine (ZANAFLEX) tablet 8 mg  8 mg Oral At Bedtime     sodium chloride (PF)  3 mL Intracatheter Q8H     senna-docusate  1-2 tablet Oral BID     insulin aspart  1-7 Units Subcutaneous TID AC     insulin aspart  1-5 Units Subcutaneous At Bedtime     influenza quadrivalent (PF) vacc age 3 yrs and older  0.5 mL Intramuscular Prior to discharge                  Physical Exam:   Blood pressure 130/88, pulse 97,  "temperature 98.1  F (36.7  C), temperature source Oral, resp. rate 16, height 1.664 m (5' 5.51\"), weight 99.8 kg (220 lb), SpO2 97 %.  Wt Readings from Last 2 Encounters:   03/03/17 99.8 kg (220 lb)   02/09/17 99 kg (218 lb 3.2 oz)     Vital Signs with Ranges  Temp:  [98  F (36.7  C)-98.1  F (36.7  C)] 98.1  F (36.7  C)  Pulse:  [97-98] 97  Heart Rate:  [70-96] 96  Resp:  [16-20] 16  BP: ()/(57-88) 130/88  SpO2:  [95 %-98 %] 97 %    Constitutional: Awake, alert, cooperative, no apparent distress   Lungs: Clear to auscultation bilaterally, no crackles or wheezing   Cardiovascular: Regular rate and rhythm, normal S1 and S2, and no murmur noted   Abdomen: Normal bowel sounds, soft, non-distended, non-tender   Skin: No rashes, no cyanosis, no edema   Other: Wd Ok          Data:   All microbiology laboratory data reviewed.  Recent Labs   Lab Test  03/06/17   0508  03/05/17   0548  03/04/17   0559   WBC  9.9  8.6  11.6*   HGB  10.1*  9.1*  9.8*   HCT  32.1*  28.5*  30.8*   MCV  82  81  81   PLT  271  249  311     Recent Labs   Lab Test  03/07/17   0553  03/06/17   0508  03/05/17   0548   CR  0.83  0.76  0.78     Recent Labs   Lab Test  03/07/17   0553   SED  88*     Recent Labs   Lab Test  03/04/17   2225  03/03/17   1525  03/03/17   1510  03/03/17   1445   CULT  No growth after 2 days  On day 2, isolated in broth only: Coagulase negative Staphylococcus  Susceptibility testing done on previous specimen not isolated or reported on   routine culture  Culture in progress  *  No growth  Light growth Coagulase negative Staphylococcus This isolate is presumed to be   clindamycin resistant based on detection of inducible clindamycin resistance.   Erythromycin and clindamycin are resistant, therefore, they are not recommended   for use. not isolated or reported on routine culture  Culture in progress  Susceptibility testing requested by DR. MAN 3.6.2017 @ 1400 SG Add Daptomycin   per Dr Man 3/7/17. NAP  *  No growth "  Moderate growth Staphylococcus aureus NOT MRSA*

## 2017-03-07 NOTE — PROVIDER NOTIFICATION
PICC Insertion Time-Out   Proceduralist prior to procedure:    Confirmed provider order for procedure    Verified appropriate supplies/equipment are available for procedure    Verified appropriate assessments have been completed     Prior to procedure the proceduralist instituted a 'Cease all activity' to confirm with a second nursing staff member the following:     Confirm patient identifiers using patient name and date of birth    Verify procedure to be performed    Verify consent was signed and witnessed    Verbalize any allergies    Verify code status     [Co-signature verification:  IV Access flowsheet > click any insertion column associated to a note > view Value Information)     Michelle Santos RN

## 2017-03-07 NOTE — PLAN OF CARE
Problem: Goal Outcome Summary  Goal: Goal Outcome Summary  Outcome: Completed Date Met:  03/07/17  Physical Therapy Discharge Summary     Reason for therapy discharge:    Discharged to home.     Progress towards therapy goal(s). See goals on Care Plan in [facility] electronic health record for goal details.  Goals met     Therapy recommendation(s):    Continue home exercise program.   \Patient not seen by documenting therapist, information obtained from previous therapy notes.   pt is independent with all bed mobility, transfers, gait with AD, and stairs with 1 hand rail

## 2017-03-07 NOTE — PLAN OF CARE
Problem: Goal Outcome Summary  Goal: Goal Outcome Summary  PT- Pt denied PT this PM due to d/c home. All goals met, pt is independent with all bed mobility, transfers, gait with AD, and stairs with 1 hand rail. Pt was educated to keep wound vac on rolling walker during ambulation.

## 2017-03-07 NOTE — PROGRESS NOTES
"[facility]    Spinal Surgery Progress Note     Assessment & Plan   Jessica Rucker is a 51 year old female who was admitted on 3/3/2017 for an incision and drainage of cervical spinal wound infection (seroma with necrotic purulent material noted). Ms. Rucker is POD # 4, scheduled for PICC line placement today, final wound cultures pending.      Neuro/pain: Aspen collar, scheduled APAP, PRN oxycodone  ID: ID consulted, vanco/rocephin scheduled, final wound cultures/susceptibilities pending, blood cultures NGTD, contact precautions  Resp: C&DB, IS  GI: scheduled senna, consistent carb diet  MS: up with assist  Skin: posterior cervical wound vac to 75 mmHg continuous suction, wound vac dressing change every MWF,   DVT prophylaxis: SCDs  Dispo: OK to discharge to home once PICC line placed and ID has finalized antibiotics pending susceptibilities.      Gurpreet Holloway, APRN, CNP    Interval History   \"It's so itchy back there\" pointing to posterior cervical surgical site.      Physical Exam   Temp: 98  F (36.7  C) Temp src: Oral BP: 97/57 Pulse: 97 Heart Rate: 70 Resp: 16 SpO2: 95 % O2 Device: None (Room air)    Vitals:    03/03/17 1331   Weight: 99.8 kg (220 lb)     Vital Signs with Ranges  Temp:  [98  F (36.7  C)-98.3  F (36.8  C)] 98  F (36.7  C)  Pulse:  [96-98] 97  Heart Rate:  [70-78] 70  Resp:  [16-20] 16  BP: ()/(57-81) 97/57  SpO2:  [95 %-98 %] 95 %  I/O last 3 completed shifts:  In: 1323 [P.O.:1320; I.V.:3]  Out: 0     Constitutional: awake, alert, cooperative, no apparent distress, and appears stated age  Skin: wound vac posterior cervical, 75 mmHg continuous suction  Musculoskeletal: full range of motion noted  motor strength is 5 out of 5 all extremities bilaterally  Neurologic: Mental Status Exam:  Level of Alertness:   awake  Orientation:   person, place, time    Medications        vancomycin (VANCOCIN) IV  1,000 mg Intravenous Q12H     sodium chloride (PF)  10 mL Intracatheter Q7 Days "     cefTRIAXone  2 g Intravenous Q24H     LORazepam (ATIVAN) tablet 2 mg  2 mg Oral At Bedtime     docusate  50 mg Oral Daily     FLUoxetine (PROzac) capsule 20 mg  20 mg Oral Daily     gabapentin (NEURONTIN) tablet 1,200 mg  1,200 mg Oral BID     liraglutide  1.2 mg Subcutaneous Daily     metFORMIN (GLUCOPHAGE) tablet 1,000 mg  1,000 mg Oral BID w/meals     metoprolol (TOPROL-XL) 24 hr tablet 25 mg  25 mg Oral Daily     nortriptyline (PAMELOR) capsule 50 mg  50 mg Oral At Bedtime     omeprazole (priLOSEC) CR capsule 20 mg  20 mg Oral BID AC     oxazepam  10 mg Oral BID     simvastatin (ZOCOR) tablet 20 mg  20 mg Oral Daily     tiZANidine (ZANAFLEX) tablet 4 mg  4 mg Oral QAM     tiZANidine (ZANAFLEX) tablet 8 mg  8 mg Oral At Bedtime     sodium chloride (PF)  3 mL Intracatheter Q8H     senna-docusate  1-2 tablet Oral BID     insulin aspart  1-7 Units Subcutaneous TID AC     insulin aspart  1-5 Units Subcutaneous At Bedtime     influenza quadrivalent (PF) vacc age 3 yrs and older  0.5 mL Intramuscular Prior to discharge       Data   Results for orders placed or performed during the hospital encounter of 03/03/17 (from the past 24 hour(s))   Glucose by meter   Result Value Ref Range    Glucose 143 (H) 70 - 99 mg/dL   Glucose by meter   Result Value Ref Range    Glucose 156 (H) 70 - 99 mg/dL   Glucose by meter   Result Value Ref Range    Glucose 139 (H) 70 - 99 mg/dL   Glucose by meter   Result Value Ref Range    Glucose 173 (H) 70 - 99 mg/dL   Glucose by meter   Result Value Ref Range    Glucose 158 (H) 70 - 99 mg/dL       Recent Labs  Lab 03/06/17  0508 03/05/17  0548 03/04/17  0559   WBC 9.9 8.6 11.6*   HGB 10.1* 9.1* 9.8*   MCV 82 81 81    249 311   NA  --  140  --    POTASSIUM  --  3.6  --    CHLORIDE  --  102  --    CO2  --  27  --    BUN  --  15  --    CR 0.76 0.78 1.25*   ANIONGAP  --  11  --    СЕРГЕЙ  --  8.3*  --    GLC  --  132*  --      Recent Labs   Lab Test 03/04/17 2225 03/03/17  1524  03/03/17  1510 03/03/17  1445   CULT No growth after 1 day On day 2, isolated in broth only: Coagulase negative Staphylococcus  Susceptibility testing done on previous specimen not isolated or reported on  routine culture  Culture in progress  *  No growth Light growth Coagulase negative Staphylococcus not isolated or reported on  routine culture  Culture in progress  Susceptibility testing requested by DR. MAN 3.6.2017 @ 1400 SG  *  No growth Moderate growth Staphylococcus aureus NOT MRSA*

## 2017-03-07 NOTE — PLAN OF CARE
Problem: Goal Outcome Summary  Goal: Goal Outcome Summary  Outcome: No Change  Pt pain mgd c 15mg oxycodone PO. Has wound vac, drsg CDI. Baseline neuropathy. SBA ti BR. Waiting for culture of wound to determine MRSA. ploan to have PICC put in today. On IV rocephin and vanco. CMS intact. VSS. Will continue to monitor

## 2017-03-07 NOTE — PLAN OF CARE
Problem: Goal Outcome Summary  Goal: Goal Outcome Summary  PT- Attempted to see but was busy with procedure, will attempt to see this morning if schedule allows, but is scheduled for PM session.

## 2017-03-07 NOTE — DISCHARGE SUMMARY
[facility]    Discharge Summary  Spinal Surgery    Date of Admission:  3/3/2017  Date of Discharge:  3/7/2017  Discharging Provider: Gurpreet HERNANDEZ CNP  Date of Service (when I saw the patient): 03/07/17    Discharge Diagnoses   Active Problems:    Post-operative complication    Wound infection      History of Present Illness   Jessica Rucker is an 51 year old female who presented with deep posterior cervical wound infection status post C5-7 posterior fusion on 2/9/2017, and a C5-7 anterior cervical discectomy and fusion.  This admission Ms. Rucker underwent an incision and drainage of cervical spinal wound infection (seroma with necrotic purulent material noted) on 3/3/2017.  Ms. Rucker had wound vac placed on posterior cervical surgical site.  Ms. Rucker will have PICC line placed prior to discharge for extended antibiotics.  Ms. Rucker has been stable throughout admission, vital signs stable, afebrile, and pain tolerable.  Hospitalist, infectious disease, wound care nurse, pharmacy, social work, and physical therapy were consulted this admission.    Hospital Course   Jessica Rucker was admitted on 3/3/2017.  The following problems were addressed during her hospitalization:    Active Problems:    Post-operative complication    Wound infection      MARY Esposito CNP    Significant Results and Procedures   DATE OF PROCEDURE: 03/03/2017       PREOPERATIVE DIAGNOSIS: Posterior cervical deep wound infection status post posterior cervical fusion.       POSTOPERATIVE DIAGNOSIS: Posterior cervical deep wound infection status post posterior cervical fusion.       SURGEON: Juan Gray MD       1ST ASSISTANT: Gurpreet Holloway CNP       PROCEDURES:   1. I&D of the posterior cervical wound infection.   2. Wound VAC application.       OPERATIVE FINDINGS: Deep wound infection going down to the hardware and bone. Seroma complicated by necrotic purulent material present overlying the hardware and bone.  Some sharp debridement needed to take out the necrotic material.       ESTIMATED BLOOD LOSS: 30 mL.       COMPLICATIONS: None.       INDICATIONS: Jessica Rucker is a 51-year-old woman who has diabetic underwent several weeks ago posterior cervical fusion at [facility]. Then presented several weeks later with tachycardia, diaphoresis, nausea. CT scan showed most likely deep wound infection, collection of large amount of fluid wound was red and erythematous. Labs showed a white count of almost 4000, high CRP and sed rate. Based upon this urgent I&D and wound VAC application was recommended. The nature of procedure and risks were discussed. The patient opted to proceed.       DESCRIPTION OF PROCEDURE: The patient was brought to the operating theater. General endotracheal anesthesia was induced in an uneventful manner. The patient was carefully log rolled into prone position onto the Emil SI frame. Diallo-InOpen tongs was applied extension mode traction. 15 pounds was instituted. Posterior cervical area was then prepped and draped using DuraPrep. Sutures were removed which were nylon baseball type of stitch sutures and once the wound was incised serous material came out first. Further exploration wound showed the purulent material sitting on top of the hardware and the posterior cervical spine indicative of wound infection. Aerobic and anaerobic cultures were taken. After that, the patient was given 1 gram of vancomycin IV. Sharp debridement was carried out, taking out some necrotic material. Three liters of Pulsavac lavage with Ancef then was used to wash out the wound. Wound VAC sponge was then cut. Around the wound edges Vaseline gauze was used so that the sponge would not stick to the wound. 50-75 low continuous suction was applied without air leak. Patient tolerated the procedure well. The patient turned into supine position. Diallo-Wells tongs was detached. Patient was taken to the recovery room  in good condition. The patient tolerated the procedure well.           LORNE PORTER MD         Pending Results   These results will be followed up by Dr. Qureshi.  Unresulted Labs Ordered in the Past 30 Days of this Admission     Date and Time Order Name Status Description    3/7/2017 0000 CRP inflammation In process     3/7/2017 0000 Erythrocyte sedimentation rate auto In process     3/7/2017 0000 Creatinine In process     3/4/2017 1849 Blood culture Preliminary     3/3/2017 1526 Anaerobic bacterial culture Preliminary     3/3/2017 1524 Anaerobic bacterial culture Preliminary           Code Status   Full Code    Primary Care Physician   Luca Schuster    Physical Exam   Temp: 98  F (36.7  C) Temp src: Oral BP: 97/57 Pulse: 97 Heart Rate: 70 Resp: 16 SpO2: 95 % O2 Device: None (Room air)    Vitals:    03/03/17 1331   Weight: 99.8 kg (220 lb)     Vital Signs with Ranges  Temp:  [98  F (36.7  C)] 98  F (36.7  C)  Pulse:  [97-98] 97  Heart Rate:  [70-78] 70  Resp:  [16-20] 16  BP: ()/(57-81) 97/57  SpO2:  [95 %-98 %] 95 %  I/O last 3 completed shifts:  In: 1323 [P.O.:1320; I.V.:3]  Out: 0     Constitutional: awake, alert, cooperative, no apparent distress, and appears stated age  Respiratory: No increased work of breathing, good air exchange, clear to auscultation bilaterally, no crackles or wheezing  Cardiovascular: Normal apical impulse, regular rate and rhythm, normal S1 and S2  GI: Normal bowel sounds, soft, non-distended, non-tender  Skin: posterior cervical wound vac  Musculoskeletal: full range of motion noted  motor strength is 5 out of 5 all extremities bilaterally  Neurologic: Mental Status Exam:  Level of Alertness:   awake  Orientation:   person, place, time    Time Spent on this Encounter   IGurpreet, personally saw the patient today and spent greater than 30 minutes discharging this patient.    Discharge Disposition   Discharged to home with home care nurse for assistance with wound vac  and IV antibiotics  Condition at discharge: Stable    Consultations This Hospital Stay   ORTHOSIS CERVICAL COLLAR IP CONSULT  OCCUPATIONAL THERAPY ADULT IP CONSULT  PHYSICAL THERAPY ADULT IP CONSULT  HOSPITALIST IP CONSULT  INFECTIOUS DISEASES IP CONSULT  PHARMACY TO DOSE VANCO  CARE COORDINATOR IP CONSULT  WOUND OSTOMY CONTINENCE NURSE  IP CONSULT  SOCIAL WORK IP CONSULT  VASCULAR ACCESS ADULT IP CONSULT  CARE COORDINATOR IP CONSULT  PHARMACY DISCHARGE EDUCATION BY PHARMACIST    Discharge Orders     Home Care Referral     Discharge Instructions     Reason for your hospital stay   You were admitted for treatment of a wound infection of a recent cervical spinal decompression and fusion  The area was debrided surgically and the cultures grew staph aureus (MRSA)  A wound vac has been placed to promote healing  And you will need to have daily IV vancomycin for at least one month to attempt to irradiate the infection  An oral medication rifampin has also been added to take twice a day for improved antibiotic coverage  You have had a lot of discomfort from the inflammation and the debridement  And you may continue to take oxycodone 10 to 15 mg every 4 to 6 hours as needed for pain control  Be careful not to get constipated take senokot twice a day and miralax daily  Continue to take your prior medications to control your diabetes and your peripheral neuropathy     Follow-up and recommended labs and tests    Follow up with infectious disease as arranged in one month   And follow up with spinal surgery as arranged     Activity   Your activity upon discharge  As tolerated and as before     Diet   Follow this diet upon discharge: moderate consistent diabetic diet     Follow up: Please follow up with Dr. Qureshi on Friday, March 10th, at [facility] at prior scheduled appointment at 1:30 for cervical CT and 2:00 for your appointment with Dr. Qureshi.    Discharge Medications   Current Discharge Medication List      START taking  these medications    Details   vancomycin (VANCOCIN) 100 mg/mL injection Inject 1 g (1,000 mg) into the vein every 12 hours ESR,CRP,CBC with differential weekly, creatinine, vancomycin trough,  twice weekly while on this medication to be faxed to Dr. Castellanos office at [phone].  Qty: 600 mL, Refills: 1    Associated Diagnoses: Wound infection      oxyCODONE (ROXICODONE) 10 MG IR tablet Take 1.5 tablets (15 mg) by mouth every 4 hours as needed for moderate to severe pain  Qty: 180 tablet, Refills: 0    Associated Diagnoses: Wound infection; Postoperative surgical complication involving musculoskeletal system associated with musculoskeletal procedure, unspecified complication; Status post cervical spinal fusion; Methicillin-resistant Staphylococcus aureus infection of postoperative wound, initial encounter      acetaminophen (TYLENOL) 325 MG tablet Take 3 tablets (975 mg) by mouth every 8 hours  Qty: 300 tablet, Refills: 1    Associated Diagnoses: Postoperative surgical complication involving musculoskeletal system associated with musculoskeletal procedure, unspecified complication; Wound infection; Status post cervical spinal fusion; Methicillin-resistant Staphylococcus aureus infection of postoperative wound, initial encounter      rifampin (RIFADIN) 300 MG capsule Take 1 capsule (300 mg) by mouth every 12 hours  Qty: 120 capsule, Refills: 3    Associated Diagnoses: Methicillin-resistant Staphylococcus aureus infection of postoperative wound, initial encounter; Status post cervical spinal fusion; Postoperative surgical complication involving musculoskeletal system associated with musculoskeletal procedure, unspecified complication; Wound infection         CONTINUE these medications which have NOT CHANGED    Details   sennosides (SENOKOT) 8.6 MG tablet Take 1 tablet by mouth daily as needed for constipation      Docusate Sodium (COLACE PO) Take 50 mg by mouth daily      liraglutide (VICTOZA) 18 MG/3ML soln Inject  1.2 mg Subcutaneous daily       !! TIZANIDINE HCL PO Take 4 mg by mouth every morning      !! TIZANIDINE HCL PO Take 8 mg by mouth At Bedtime      OMEPRAZOLE PO Take 20 mg by mouth 2 times daily (before meals)      FLUoxetine HCl (PROZAC PO) Take 20 mg by mouth daily      glimepiride (AMARYL) 2 MG tablet Take 2 mg by mouth 2 times daily      METFORMIN HCL PO Take 1,000 mg by mouth 2 times daily (with meals)      GABAPENTIN PO Take 1,200 mg by mouth 2 times daily      OXAZEPAM PO Take 10 mg by mouth 2 times daily      LORAZEPAM PO Take 2 mg by mouth nightly as needed for anxiety       METOPROLOL SUCCINATE ER PO Take 25 mg by mouth daily      NORTRIPTYLINE HCL PO Take 50 mg by mouth At Bedtime       SIMVASTATIN PO Take 20 mg by mouth daily      albuterol (PROAIR HFA, PROVENTIL HFA, VENTOLIN HFA) 108 (90 BASE) MCG/ACT inhaler Inhale 2 puffs into the lungs every 6 hours as needed        !! - Potential duplicate medications found. Please discuss with provider.      STOP taking these medications       HYDROcodone-acetaminophen (NORCO) 5-325 MG per tablet Comments:   Reason for Stopping:         HYDROXYZINE HCL PO Comments:   Reason for Stopping:             Allergies   Allergies   Allergen Reactions     Tetanus Toxoid Anaphylaxis     Wellbutrin [Bupropion] Other (See Comments)     confusion     Adhesive Tape Rash     Cloth and paper tape ok     Data   Most Recent 3 CBC's:  Recent Labs   Lab Test  03/06/17   0508  03/05/17   0548  03/04/17   0559   WBC  9.9  8.6  11.6*   HGB  10.1*  9.1*  9.8*   MCV  82  81  81   PLT  271  249  311      Most Recent 3 BMP's:  Recent Labs   Lab Test  03/06/17   0508  03/05/17   0548  03/04/17   0559  02/10/17   1230  02/10/17   0615  05/24/14   0540  05/23/14   0655   NA   --   140   --    --   134   --   139   POTASSIUM   --   3.6   --   4.4  5.4*   --   4.3   CHLORIDE   --   102   --    --   99   --   103   CO2   --   27   --    --   26   --   21   BUN   --   15   --    --   17   --   15    CR  0.76  0.78  1.25*   --   0.86   --   0.85   ANIONGAP   --   11   --    --   9   --   15   СЕРГЕЙ   --   8.3*   --    --   8.8   --   8.4*   GLC   --   132*   --    --   208*  211*  263*     Most Recent 6 Bacteria Isolates From Any Culture (See EPIC Reports for Culture Details):  Recent Labs   Lab Test  03/04/17   2225  03/03/17   1525  03/03/17   1510  03/03/17   1445   CULT  No growth after 2 days  On day 2, isolated in broth only: Coagulase negative Staphylococcus  Susceptibility testing done on previous specimen not isolated or reported on   routine culture  Culture in progress  *  No growth  Light growth Coagulase negative Staphylococcus This isolate is presumed to be   clindamycin resistant based on detection of inducible clindamycin resistance.   Erythromycin and clindamycin are resistant, therefore, they are not recommended   for use. not isolated or reported on routine culture  Culture in progress  Susceptibility testing requested by DR. MAN 3.6.2017 @ 1400 SG  *  No growth  Moderate growth Staphylococcus aureus NOT MRSA*

## 2017-03-08 LAB
BUN SERPL-MCNC: 10 MG/DL (ref 7–30)
CREAT SERPL-MCNC: 0.75 MG/DL (ref 0.52–1.04)
CRP SERPL-MCNC: 14.3 MG/L (ref 0–8)
ERYTHROCYTE [DISTWIDTH] IN BLOOD BY AUTOMATED COUNT: 14.9 % (ref 10–15)
ERYTHROCYTE [SEDIMENTATION RATE] IN BLOOD BY WESTERGREN METHOD: 55 MM/H (ref 0–30)
GFR SERPL CREATININE-BSD FRML MDRD: 81 ML/MIN/1.7M2
HCT VFR BLD AUTO: 31.4 % (ref 35–47)
HGB BLD-MCNC: 10.1 G/DL (ref 11.7–15.7)
MCH RBC QN AUTO: 26.3 PG (ref 26.5–33)
MCHC RBC AUTO-ENTMCNC: 32.2 G/DL (ref 31.5–36.5)
MCV RBC AUTO: 82 FL (ref 78–100)
PLATELET # BLD AUTO: 213 10E9/L (ref 150–450)
RBC # BLD AUTO: 3.84 10E12/L (ref 3.8–5.2)
VANCOMYCIN SERPL-MCNC: 11.7 MG/L
WBC # BLD AUTO: 7.3 10E9/L (ref 4–11)

## 2017-03-08 PROCEDURE — 82565 ASSAY OF CREATININE: CPT | Performed by: INTERNAL MEDICINE

## 2017-03-08 PROCEDURE — 85027 COMPLETE CBC AUTOMATED: CPT | Performed by: INTERNAL MEDICINE

## 2017-03-08 PROCEDURE — 86140 C-REACTIVE PROTEIN: CPT | Performed by: INTERNAL MEDICINE

## 2017-03-08 PROCEDURE — 80202 ASSAY OF VANCOMYCIN: CPT | Performed by: INTERNAL MEDICINE

## 2017-03-08 PROCEDURE — 85652 RBC SED RATE AUTOMATED: CPT | Performed by: INTERNAL MEDICINE

## 2017-03-08 PROCEDURE — 84520 ASSAY OF UREA NITROGEN: CPT | Performed by: INTERNAL MEDICINE

## 2017-03-10 LAB
BACTERIA SPEC CULT: ABNORMAL
BACTERIA SPEC CULT: ABNORMAL
Lab: ABNORMAL
Lab: ABNORMAL
MICRO REPORT STATUS: ABNORMAL
MICRO REPORT STATUS: ABNORMAL
MICROORGANISM SPEC CULT: ABNORMAL
MICROORGANISM SPEC CULT: ABNORMAL
SPECIMEN SOURCE: ABNORMAL
SPECIMEN SOURCE: ABNORMAL

## 2017-03-11 LAB
BACTERIA SPEC CULT: NO GROWTH
Lab: NORMAL
MICRO REPORT STATUS: NORMAL
SPECIMEN SOURCE: NORMAL

## 2017-03-15 LAB
BASOPHILS # BLD AUTO: 0 10E9/L (ref 0–0.2)
BASOPHILS NFR BLD AUTO: 0.5 %
BUN SERPL-MCNC: 11 MG/DL (ref 7–30)
CREAT SERPL-MCNC: 0.83 MG/DL (ref 0.52–1.04)
CRP SERPL-MCNC: 9.9 MG/L (ref 0–8)
DIFFERENTIAL METHOD BLD: ABNORMAL
EOSINOPHIL # BLD AUTO: 0.2 10E9/L (ref 0–0.7)
EOSINOPHIL NFR BLD AUTO: 3.7 %
ERYTHROCYTE [DISTWIDTH] IN BLOOD BY AUTOMATED COUNT: 15.2 % (ref 10–15)
ERYTHROCYTE [SEDIMENTATION RATE] IN BLOOD BY WESTERGREN METHOD: 48 MM/H (ref 0–30)
GFR SERPL CREATININE-BSD FRML MDRD: 72 ML/MIN/1.7M2
HCT VFR BLD AUTO: 33.7 % (ref 35–47)
HGB BLD-MCNC: 11.2 G/DL (ref 11.7–15.7)
IMM GRANULOCYTES # BLD: 0 10E9/L (ref 0–0.4)
IMM GRANULOCYTES NFR BLD: 0.3 %
LYMPHOCYTES # BLD AUTO: 3.7 10E9/L (ref 0.8–5.3)
LYMPHOCYTES NFR BLD AUTO: 55.7 %
MCH RBC QN AUTO: 26.5 PG (ref 26.5–33)
MCHC RBC AUTO-ENTMCNC: 33.2 G/DL (ref 31.5–36.5)
MCV RBC AUTO: 80 FL (ref 78–100)
MONOCYTES # BLD AUTO: 0.5 10E9/L (ref 0–1.3)
MONOCYTES NFR BLD AUTO: 7 %
NEUTROPHILS # BLD AUTO: 2.2 10E9/L (ref 1.6–8.3)
NEUTROPHILS NFR BLD AUTO: 32.8 %
NRBC # BLD AUTO: 0 10*3/UL
NRBC BLD AUTO-RTO: 0 /100
PLATELET # BLD AUTO: 174 10E9/L (ref 150–450)
RBC # BLD AUTO: 4.22 10E12/L (ref 3.8–5.2)
VANCOMYCIN SERPL-MCNC: 20.5 MG/L
WBC # BLD AUTO: 6.6 10E9/L (ref 4–11)

## 2017-03-15 PROCEDURE — 82565 ASSAY OF CREATININE: CPT | Performed by: INTERNAL MEDICINE

## 2017-03-15 PROCEDURE — 85652 RBC SED RATE AUTOMATED: CPT | Performed by: INTERNAL MEDICINE

## 2017-03-15 PROCEDURE — 86140 C-REACTIVE PROTEIN: CPT | Performed by: INTERNAL MEDICINE

## 2017-03-15 PROCEDURE — 85025 COMPLETE CBC W/AUTO DIFF WBC: CPT | Performed by: INTERNAL MEDICINE

## 2017-03-15 PROCEDURE — 80202 ASSAY OF VANCOMYCIN: CPT | Performed by: INTERNAL MEDICINE

## 2017-03-15 PROCEDURE — 84520 ASSAY OF UREA NITROGEN: CPT | Performed by: INTERNAL MEDICINE

## 2017-03-17 LAB
BASOPHILS # BLD AUTO: 0 10E9/L (ref 0–0.2)
BASOPHILS NFR BLD AUTO: 0.3 %
BUN SERPL-MCNC: 12 MG/DL (ref 7–30)
CREAT SERPL-MCNC: 0.82 MG/DL (ref 0.52–1.04)
CRP SERPL-MCNC: 12.6 MG/L (ref 0–8)
DIFFERENTIAL METHOD BLD: ABNORMAL
EOSINOPHIL # BLD AUTO: 0.4 10E9/L (ref 0–0.7)
EOSINOPHIL NFR BLD AUTO: 4.6 %
ERYTHROCYTE [DISTWIDTH] IN BLOOD BY AUTOMATED COUNT: 15.2 % (ref 10–15)
GFR SERPL CREATININE-BSD FRML MDRD: 73 ML/MIN/1.7M2
HCT VFR BLD AUTO: 31.7 % (ref 35–47)
HGB BLD-MCNC: 10.3 G/DL (ref 11.7–15.7)
IMM GRANULOCYTES # BLD: 0 10E9/L (ref 0–0.4)
IMM GRANULOCYTES NFR BLD: 0.3 %
LYMPHOCYTES # BLD AUTO: 3.8 10E9/L (ref 0.8–5.3)
LYMPHOCYTES NFR BLD AUTO: 43.2 %
MCH RBC QN AUTO: 26 PG (ref 26.5–33)
MCHC RBC AUTO-ENTMCNC: 32.5 G/DL (ref 31.5–36.5)
MCV RBC AUTO: 80 FL (ref 78–100)
MONOCYTES # BLD AUTO: 0.6 10E9/L (ref 0–1.3)
MONOCYTES NFR BLD AUTO: 7.4 %
NEUTROPHILS # BLD AUTO: 3.8 10E9/L (ref 1.6–8.3)
NEUTROPHILS NFR BLD AUTO: 44.2 %
NRBC # BLD AUTO: 0 10*3/UL
NRBC BLD AUTO-RTO: 0 /100
PLATELET # BLD AUTO: 245 10E9/L (ref 150–450)
RBC # BLD AUTO: 3.96 10E12/L (ref 3.8–5.2)
VANCOMYCIN SERPL-MCNC: 19.9 MG/L
WBC # BLD AUTO: 8.7 10E9/L (ref 4–11)

## 2017-03-17 PROCEDURE — 80202 ASSAY OF VANCOMYCIN: CPT | Performed by: INTERNAL MEDICINE

## 2017-03-17 PROCEDURE — 86140 C-REACTIVE PROTEIN: CPT | Performed by: INTERNAL MEDICINE

## 2017-03-17 PROCEDURE — 84520 ASSAY OF UREA NITROGEN: CPT | Performed by: INTERNAL MEDICINE

## 2017-03-17 PROCEDURE — 82565 ASSAY OF CREATININE: CPT | Performed by: INTERNAL MEDICINE

## 2017-03-17 PROCEDURE — 85025 COMPLETE CBC W/AUTO DIFF WBC: CPT | Performed by: INTERNAL MEDICINE

## 2017-03-21 LAB
BASOPHILS # BLD AUTO: 0 10E9/L (ref 0–0.2)
BASOPHILS NFR BLD AUTO: 0.3 %
BUN SERPL-MCNC: 10 MG/DL (ref 7–30)
CREAT SERPL-MCNC: 0.81 MG/DL (ref 0.52–1.04)
CRP SERPL-MCNC: 15.4 MG/L (ref 0–8)
DIFFERENTIAL METHOD BLD: ABNORMAL
EOSINOPHIL # BLD AUTO: 0.5 10E9/L (ref 0–0.7)
EOSINOPHIL NFR BLD AUTO: 5.4 %
ERYTHROCYTE [DISTWIDTH] IN BLOOD BY AUTOMATED COUNT: 14.8 % (ref 10–15)
ERYTHROCYTE [SEDIMENTATION RATE] IN BLOOD BY WESTERGREN METHOD: 55 MM/H (ref 0–30)
GFR SERPL CREATININE-BSD FRML MDRD: 74 ML/MIN/1.7M2
HCT VFR BLD AUTO: 32.9 % (ref 35–47)
HGB BLD-MCNC: 10.5 G/DL (ref 11.7–15.7)
IMM GRANULOCYTES # BLD: 0 10E9/L (ref 0–0.4)
IMM GRANULOCYTES NFR BLD: 0.3 %
LYMPHOCYTES # BLD AUTO: 4.9 10E9/L (ref 0.8–5.3)
LYMPHOCYTES NFR BLD AUTO: 55.4 %
MCH RBC QN AUTO: 26.2 PG (ref 26.5–33)
MCHC RBC AUTO-ENTMCNC: 31.9 G/DL (ref 31.5–36.5)
MCV RBC AUTO: 82 FL (ref 78–100)
MONOCYTES # BLD AUTO: 0.8 10E9/L (ref 0–1.3)
MONOCYTES NFR BLD AUTO: 9.4 %
NEUTROPHILS # BLD AUTO: 2.6 10E9/L (ref 1.6–8.3)
NEUTROPHILS NFR BLD AUTO: 29.2 %
NRBC # BLD AUTO: 0 10*3/UL
NRBC BLD AUTO-RTO: 0 /100
PLATELET # BLD AUTO: 294 10E9/L (ref 150–450)
RBC # BLD AUTO: 4.01 10E12/L (ref 3.8–5.2)
VANCOMYCIN SERPL-MCNC: 8.7 MG/L
WBC # BLD AUTO: 8.8 10E9/L (ref 4–11)

## 2017-03-21 PROCEDURE — 86140 C-REACTIVE PROTEIN: CPT | Performed by: INTERNAL MEDICINE

## 2017-03-21 PROCEDURE — 82565 ASSAY OF CREATININE: CPT | Performed by: INTERNAL MEDICINE

## 2017-03-21 PROCEDURE — 85652 RBC SED RATE AUTOMATED: CPT | Performed by: INTERNAL MEDICINE

## 2017-03-21 PROCEDURE — 85025 COMPLETE CBC W/AUTO DIFF WBC: CPT | Performed by: INTERNAL MEDICINE

## 2017-03-21 PROCEDURE — 84520 ASSAY OF UREA NITROGEN: CPT | Performed by: INTERNAL MEDICINE

## 2017-03-21 PROCEDURE — 80202 ASSAY OF VANCOMYCIN: CPT | Performed by: INTERNAL MEDICINE

## 2017-03-24 LAB
CREAT SERPL-MCNC: 0.84 MG/DL (ref 0.52–1.04)
GFR SERPL CREATININE-BSD FRML MDRD: 71 ML/MIN/1.7M2
VANCOMYCIN SERPL-MCNC: 16.6 MG/L

## 2017-03-24 PROCEDURE — 82565 ASSAY OF CREATININE: CPT | Performed by: INTERNAL MEDICINE

## 2017-03-24 PROCEDURE — 80202 ASSAY OF VANCOMYCIN: CPT | Performed by: INTERNAL MEDICINE

## 2017-03-27 LAB
BASOPHILS # BLD AUTO: 0 10E9/L (ref 0–0.2)
BASOPHILS NFR BLD AUTO: 0.4 %
BUN SERPL-MCNC: 15 MG/DL (ref 7–30)
CREAT SERPL-MCNC: 0.92 MG/DL (ref 0.52–1.04)
CRP SERPL-MCNC: 16.8 MG/L (ref 0–8)
DIFFERENTIAL METHOD BLD: ABNORMAL
EOSINOPHIL # BLD AUTO: 0.4 10E9/L (ref 0–0.7)
EOSINOPHIL NFR BLD AUTO: 5.5 %
ERYTHROCYTE [DISTWIDTH] IN BLOOD BY AUTOMATED COUNT: 14.6 % (ref 10–15)
ERYTHROCYTE [SEDIMENTATION RATE] IN BLOOD BY WESTERGREN METHOD: 74 MM/H (ref 0–30)
GFR SERPL CREATININE-BSD FRML MDRD: 64 ML/MIN/1.7M2
HCT VFR BLD AUTO: 30.4 % (ref 35–47)
HGB BLD-MCNC: 9.9 G/DL (ref 11.7–15.7)
IMM GRANULOCYTES # BLD: 0 10E9/L (ref 0–0.4)
IMM GRANULOCYTES NFR BLD: 0.3 %
LYMPHOCYTES # BLD AUTO: 3.8 10E9/L (ref 0.8–5.3)
LYMPHOCYTES NFR BLD AUTO: 49.2 %
MCH RBC QN AUTO: 26.5 PG (ref 26.5–33)
MCHC RBC AUTO-ENTMCNC: 32.6 G/DL (ref 31.5–36.5)
MCV RBC AUTO: 81 FL (ref 78–100)
MONOCYTES # BLD AUTO: 0.7 10E9/L (ref 0–1.3)
MONOCYTES NFR BLD AUTO: 8.6 %
NEUTROPHILS # BLD AUTO: 2.8 10E9/L (ref 1.6–8.3)
NEUTROPHILS NFR BLD AUTO: 36 %
NRBC # BLD AUTO: 0 10*3/UL
NRBC BLD AUTO-RTO: 0 /100
PLATELET # BLD AUTO: 255 10E9/L (ref 150–450)
RBC # BLD AUTO: 3.74 10E12/L (ref 3.8–5.2)
VANCOMYCIN SERPL-MCNC: 17.9 MG/L
WBC # BLD AUTO: 7.8 10E9/L (ref 4–11)

## 2017-03-27 PROCEDURE — 85652 RBC SED RATE AUTOMATED: CPT | Performed by: INTERNAL MEDICINE

## 2017-03-27 PROCEDURE — 84520 ASSAY OF UREA NITROGEN: CPT | Performed by: INTERNAL MEDICINE

## 2017-03-27 PROCEDURE — 82565 ASSAY OF CREATININE: CPT | Performed by: INTERNAL MEDICINE

## 2017-03-27 PROCEDURE — 85025 COMPLETE CBC W/AUTO DIFF WBC: CPT | Performed by: INTERNAL MEDICINE

## 2017-03-27 PROCEDURE — 86140 C-REACTIVE PROTEIN: CPT | Performed by: INTERNAL MEDICINE

## 2017-03-27 PROCEDURE — 80202 ASSAY OF VANCOMYCIN: CPT | Performed by: INTERNAL MEDICINE

## 2017-03-31 LAB
BASOPHILS # BLD AUTO: 0 10E9/L (ref 0–0.2)
BASOPHILS NFR BLD AUTO: 0.2 %
BUN SERPL-MCNC: 13 MG/DL (ref 7–30)
CREAT SERPL-MCNC: 1.14 MG/DL (ref 0.52–1.04)
CRP SERPL-MCNC: 8.3 MG/L (ref 0–8)
DIFFERENTIAL METHOD BLD: ABNORMAL
EOSINOPHIL # BLD AUTO: 0.4 10E9/L (ref 0–0.7)
EOSINOPHIL NFR BLD AUTO: 5.4 %
ERYTHROCYTE [DISTWIDTH] IN BLOOD BY AUTOMATED COUNT: 14.5 % (ref 10–15)
ERYTHROCYTE [SEDIMENTATION RATE] IN BLOOD BY WESTERGREN METHOD: 65 MM/H (ref 0–30)
GFR SERPL CREATININE-BSD FRML MDRD: 50 ML/MIN/1.7M2
HCT VFR BLD AUTO: 30.4 % (ref 35–47)
HGB BLD-MCNC: 9.6 G/DL (ref 11.7–15.7)
IMM GRANULOCYTES # BLD: 0 10E9/L (ref 0–0.4)
IMM GRANULOCYTES NFR BLD: 0.2 %
LYMPHOCYTES # BLD AUTO: 2.8 10E9/L (ref 0.8–5.3)
LYMPHOCYTES NFR BLD AUTO: 35.1 %
MCH RBC QN AUTO: 26.3 PG (ref 26.5–33)
MCHC RBC AUTO-ENTMCNC: 31.6 G/DL (ref 31.5–36.5)
MCV RBC AUTO: 83 FL (ref 78–100)
MONOCYTES # BLD AUTO: 0.6 10E9/L (ref 0–1.3)
MONOCYTES NFR BLD AUTO: 6.8 %
NEUTROPHILS # BLD AUTO: 4.2 10E9/L (ref 1.6–8.3)
NEUTROPHILS NFR BLD AUTO: 52.3 %
NRBC # BLD AUTO: 0 10*3/UL
NRBC BLD AUTO-RTO: 0 /100
PLATELET # BLD AUTO: 243 10E9/L (ref 150–450)
RBC # BLD AUTO: 3.65 10E12/L (ref 3.8–5.2)
VANCOMYCIN SERPL-MCNC: 22.6 MG/L
WBC # BLD AUTO: 8.1 10E9/L (ref 4–11)

## 2017-03-31 PROCEDURE — 85025 COMPLETE CBC W/AUTO DIFF WBC: CPT | Performed by: INTERNAL MEDICINE

## 2017-03-31 PROCEDURE — 86140 C-REACTIVE PROTEIN: CPT | Performed by: INTERNAL MEDICINE

## 2017-03-31 PROCEDURE — 82565 ASSAY OF CREATININE: CPT | Performed by: INTERNAL MEDICINE

## 2017-03-31 PROCEDURE — 80202 ASSAY OF VANCOMYCIN: CPT | Performed by: INTERNAL MEDICINE

## 2017-03-31 PROCEDURE — 85652 RBC SED RATE AUTOMATED: CPT | Performed by: INTERNAL MEDICINE

## 2017-03-31 PROCEDURE — 84520 ASSAY OF UREA NITROGEN: CPT | Performed by: INTERNAL MEDICINE

## 2017-04-03 LAB
BASOPHILS # BLD AUTO: 0 10E9/L (ref 0–0.2)
BASOPHILS NFR BLD AUTO: 0.2 %
BUN SERPL-MCNC: 13 MG/DL (ref 7–30)
CREAT SERPL-MCNC: 1.18 MG/DL (ref 0.52–1.04)
CRP SERPL-MCNC: 10.5 MG/L (ref 0–8)
DIFFERENTIAL METHOD BLD: ABNORMAL
EOSINOPHIL # BLD AUTO: 0.5 10E9/L (ref 0–0.7)
EOSINOPHIL NFR BLD AUTO: 6.1 %
ERYTHROCYTE [DISTWIDTH] IN BLOOD BY AUTOMATED COUNT: 14.6 % (ref 10–15)
ERYTHROCYTE [SEDIMENTATION RATE] IN BLOOD BY WESTERGREN METHOD: 63 MM/H (ref 0–30)
GFR SERPL CREATININE-BSD FRML MDRD: 48 ML/MIN/1.7M2
HCT VFR BLD AUTO: 32.9 % (ref 35–47)
HGB BLD-MCNC: 10.7 G/DL (ref 11.7–15.7)
IMM GRANULOCYTES # BLD: 0 10E9/L (ref 0–0.4)
IMM GRANULOCYTES NFR BLD: 0.3 %
LYMPHOCYTES # BLD AUTO: 3.7 10E9/L (ref 0.8–5.3)
LYMPHOCYTES NFR BLD AUTO: 41.6 %
MCH RBC QN AUTO: 26.8 PG (ref 26.5–33)
MCHC RBC AUTO-ENTMCNC: 32.5 G/DL (ref 31.5–36.5)
MCV RBC AUTO: 83 FL (ref 78–100)
MONOCYTES # BLD AUTO: 0.8 10E9/L (ref 0–1.3)
MONOCYTES NFR BLD AUTO: 9.2 %
NEUTROPHILS # BLD AUTO: 3.8 10E9/L (ref 1.6–8.3)
NEUTROPHILS NFR BLD AUTO: 42.6 %
NRBC # BLD AUTO: 0 10*3/UL
NRBC BLD AUTO-RTO: 0 /100
PLATELET # BLD AUTO: 297 10E9/L (ref 150–450)
RBC # BLD AUTO: 3.99 10E12/L (ref 3.8–5.2)
VANCOMYCIN SERPL-MCNC: 14 MG/L
WBC # BLD AUTO: 8.9 10E9/L (ref 4–11)

## 2017-04-03 PROCEDURE — 80202 ASSAY OF VANCOMYCIN: CPT | Performed by: INTERNAL MEDICINE

## 2017-04-03 PROCEDURE — 85652 RBC SED RATE AUTOMATED: CPT | Performed by: INTERNAL MEDICINE

## 2017-04-03 PROCEDURE — 82565 ASSAY OF CREATININE: CPT | Performed by: INTERNAL MEDICINE

## 2017-04-03 PROCEDURE — 84520 ASSAY OF UREA NITROGEN: CPT | Performed by: INTERNAL MEDICINE

## 2017-04-03 PROCEDURE — 85025 COMPLETE CBC W/AUTO DIFF WBC: CPT | Performed by: INTERNAL MEDICINE

## 2017-04-03 PROCEDURE — 86140 C-REACTIVE PROTEIN: CPT | Performed by: INTERNAL MEDICINE

## 2017-04-07 LAB
CREAT SERPL-MCNC: 1.36 MG/DL (ref 0.52–1.04)
GFR SERPL CREATININE-BSD FRML MDRD: 41 ML/MIN/1.7M2
VANCOMYCIN SERPL-MCNC: 16 MG/L

## 2017-04-07 PROCEDURE — 80202 ASSAY OF VANCOMYCIN: CPT | Performed by: INTERNAL MEDICINE

## 2017-04-07 PROCEDURE — 82565 ASSAY OF CREATININE: CPT | Performed by: INTERNAL MEDICINE

## 2017-04-10 LAB
BASOPHILS # BLD AUTO: 0.1 10E9/L (ref 0–0.2)
BASOPHILS NFR BLD AUTO: 0.4 %
BUN SERPL-MCNC: 22 MG/DL (ref 7–30)
CREAT SERPL-MCNC: 1.07 MG/DL (ref 0.52–1.04)
CRP SERPL-MCNC: 23.1 MG/L (ref 0–8)
DIFFERENTIAL METHOD BLD: ABNORMAL
EOSINOPHIL # BLD AUTO: 0.8 10E9/L (ref 0–0.7)
EOSINOPHIL NFR BLD AUTO: 6.5 %
ERYTHROCYTE [DISTWIDTH] IN BLOOD BY AUTOMATED COUNT: 13.6 % (ref 10–15)
ERYTHROCYTE [SEDIMENTATION RATE] IN BLOOD BY WESTERGREN METHOD: 81 MM/H (ref 0–30)
GFR SERPL CREATININE-BSD FRML MDRD: 54 ML/MIN/1.7M2
HCT VFR BLD AUTO: 33 % (ref 35–47)
HGB BLD-MCNC: 10.7 G/DL (ref 11.7–15.7)
IMM GRANULOCYTES # BLD: 0.1 10E9/L (ref 0–0.4)
IMM GRANULOCYTES NFR BLD: 0.6 %
LYMPHOCYTES # BLD AUTO: 5.3 10E9/L (ref 0.8–5.3)
LYMPHOCYTES NFR BLD AUTO: 46.2 %
MCH RBC QN AUTO: 26.6 PG (ref 26.5–33)
MCHC RBC AUTO-ENTMCNC: 32.4 G/DL (ref 31.5–36.5)
MCV RBC AUTO: 82 FL (ref 78–100)
MONOCYTES # BLD AUTO: 1.2 10E9/L (ref 0–1.3)
MONOCYTES NFR BLD AUTO: 10.6 %
NEUTROPHILS # BLD AUTO: 4.1 10E9/L (ref 1.6–8.3)
NEUTROPHILS NFR BLD AUTO: 35.7 %
NRBC # BLD AUTO: 0 10*3/UL
NRBC BLD AUTO-RTO: 0 /100
PLATELET # BLD AUTO: 292 10E9/L (ref 150–450)
PLATELET # BLD EST: ABNORMAL 10*3/UL
RBC # BLD AUTO: 4.03 10E12/L (ref 3.8–5.2)
RBC MORPH BLD: NORMAL
VANCOMYCIN SERPL-MCNC: 12.5 MG/L
WBC # BLD AUTO: 11.5 10E9/L (ref 4–11)

## 2017-04-10 PROCEDURE — 85025 COMPLETE CBC W/AUTO DIFF WBC: CPT | Performed by: INTERNAL MEDICINE

## 2017-04-10 PROCEDURE — 85652 RBC SED RATE AUTOMATED: CPT | Performed by: INTERNAL MEDICINE

## 2017-04-10 PROCEDURE — 82565 ASSAY OF CREATININE: CPT | Performed by: INTERNAL MEDICINE

## 2017-04-10 PROCEDURE — 80202 ASSAY OF VANCOMYCIN: CPT | Performed by: INTERNAL MEDICINE

## 2017-04-10 PROCEDURE — 84520 ASSAY OF UREA NITROGEN: CPT | Performed by: INTERNAL MEDICINE

## 2017-04-10 PROCEDURE — 86140 C-REACTIVE PROTEIN: CPT | Performed by: INTERNAL MEDICINE

## 2017-09-17 NOTE — PROGRESS NOTES
Pending Prescriptions:                       Disp   Refills    losartan (COZAAR) 25 MG tablet            5 tabl*0            Sig: Take 0.5 tablets (12.5 mg) by mouth daily              Last Written Prescription Date: 9/6/2017  Last Fill Quantity: 5, # refills: 0  Last Office Visit with [facility], [facility] or [facility] prescribing provider: 8/18/2017, [place]       Potassium   Date Value Ref Range Status   04/11/2017 4.0 3.4 - 5.3 mmol/L Final     Creatinine   Date Value Ref Range Status   04/11/2017 1.02 0.52 - 1.04 mg/dL Final     BP Readings from Last 3 Encounters:   08/18/17 140/90   07/20/17 126/80   06/02/17 118/72        [facility]  WO Nurse Inpatient Wound Assessment-[facility] VAC Dressing changes      Assessment of wound(s) on pt's:   Posterior Cervical        Data:   Patient History:      per MD note(s): 51 year old female who was admitted on 3/3/2017 for an incision and drainage of cervical spinal wound infection (seroma with necrotic purulent material noted). Ms. Rucker is POD # 3, awaiting wound culture results to determine extended antibiotics, ID consulted to assist, thank you, and wound vac dressing changed today by wound RN, thank you, which will be changed every MWF.   WOC nurse completed wound VAC dressing change on Monday using 1 pc black foam and adaptic. VAC paperwork completed and approved by [facility] for Home VAC Ready Care pump. Signatures obtained by pt and faxed to [facility].           Assessment:       Surgical wound appropriate for wound VAC, hardware and bone at wound base will make this difficult to heal especially in light of the co-morbidities (diabetes) and presence of infection.          Plan:     Nursing to notify the Provider(s) and re-consult the WO Nurse if wound(s) deteriorate(s).    Plan for care of wound located on posterior Cervical: M-W-F wound VAC NPWT -75mmHg    Home care will provide dressing changes    Face to face time: 20 Minutes

## 2021-05-24 ENCOUNTER — RECORDS - HEALTHEAST (OUTPATIENT)
Dept: ADMINISTRATIVE | Facility: CLINIC | Age: 56
End: 2021-05-24

## 2021-05-25 ENCOUNTER — RECORDS - HEALTHEAST (OUTPATIENT)
Dept: ADMINISTRATIVE | Facility: CLINIC | Age: 56
End: 2021-05-25

## 2021-05-26 ENCOUNTER — RECORDS - HEALTHEAST (OUTPATIENT)
Dept: ADMINISTRATIVE | Facility: CLINIC | Age: 56
End: 2021-05-26

## 2021-05-27 ENCOUNTER — RECORDS - HEALTHEAST (OUTPATIENT)
Dept: ADMINISTRATIVE | Facility: CLINIC | Age: 56
End: 2021-05-27

## 2021-05-28 ENCOUNTER — RECORDS - HEALTHEAST (OUTPATIENT)
Dept: ADMINISTRATIVE | Facility: CLINIC | Age: 56
End: 2021-05-28

## 2021-05-29 ENCOUNTER — RECORDS - HEALTHEAST (OUTPATIENT)
Dept: ADMINISTRATIVE | Facility: CLINIC | Age: 56
End: 2021-05-29

## 2021-06-01 ENCOUNTER — RECORDS - HEALTHEAST (OUTPATIENT)
Dept: ADMINISTRATIVE | Facility: CLINIC | Age: 56
End: 2021-06-01

## 2022-02-17 PROBLEM — T84.9XXA UNSPECIFIED COMPLICATION OF INTERNAL ORTHOPEDIC PROSTHETIC DEVICE, IMPLANT AND GRAFT, INITIAL ENCOUNTER (H): Status: ACTIVE | Noted: 2017-02-09

## 2023-04-19 NOTE — PLAN OF CARE
Problem: Goal Outcome Summary  Goal: Goal Outcome Summary  PT: Pt attempted for AM session. Pt with another provider at this time. Will see for scheduled PM session.        · PDMP and care everywhere reviewed    · She is maintained on  Suboxone TID 8mg-4mg-8mg

## (undated) DEVICE — SU DERMABOND ADVANCED .7ML DNX12

## (undated) DEVICE — NDL SPINAL 18GA 3.5" 405184

## (undated) DEVICE — MIDAS REX DISSECTING TOOL  14MH30

## (undated) DEVICE — STPL SKIN 35W APPOSE 8886803712

## (undated) DEVICE — CATH TRAY FOLEY COUDE 16FR STATLOCK LATEX 304416A

## (undated) DEVICE — DRAPE C-ARM 60X42" 1013

## (undated) DEVICE — GLOVE PROTEXIS BLUE W/NEU-THERA 8.0  2D73EB80

## (undated) DEVICE — CATH TRAY FOLEY SURESTEP 16FR W/URINE MTR STATLK LF A303416A

## (undated) DEVICE — DRAPE IOBAN INCISE 23X17" 6650EZ

## (undated) DEVICE — PREP DURAPREP 26ML APL 8630

## (undated) DEVICE — SOL NACL 0.9% IRRIG 1000ML BOTTLE 2F7124

## (undated) DEVICE — PACK SMALL SPINE RIDGES

## (undated) DEVICE — SPONGE COTTONOID 1/2X1/2" 80-1400

## (undated) DEVICE — BAG CLEAR TRASH 1.3M 39X33" P4040C

## (undated) DEVICE — KIT PATIENT CARE JACKSON SPINE PACK 5808PV

## (undated) DEVICE — DOCENT DISPOSABLE NEURO MONITORING PROBE

## (undated) DEVICE — SUCTION TIP YANKAUER W/O VENT K86

## (undated) DEVICE — LINEN DRAPE 54X72" 5467

## (undated) DEVICE — DECANTER VIAL 2006S

## (undated) DEVICE — SOL WATER IRRIG 1000ML BOTTLE 2F7114

## (undated) DEVICE — SUCTION MANIFOLD NEPTUNE 2 SYS 4 PORT 0702-020-000

## (undated) DEVICE — DRSG TEGADERM 4X4 3/4" 1626W

## (undated) DEVICE — CUSHION INSERT LG PRONE VIEW JACKSON TABLE

## (undated) DEVICE — DRSG VASELINE 3X18" 8884414600

## (undated) DEVICE — SU VICRYL 2-0 CT-2 27" UND J269H

## (undated) DEVICE — LINEN POUCH DBL 5427

## (undated) DEVICE — TUBING SUCTION 12"X1/4" N612

## (undated) DEVICE — ESU GROUND PAD ADULT W/CORD E7507

## (undated) DEVICE — LINEN FULL SHEET 5511

## (undated) DEVICE — SOL NACL 0.9% 20ML VIAL 4888-20

## (undated) DEVICE — DRSG ADAPTIC 3X3" 6112

## (undated) DEVICE — SPONGE SURGIFOAM 01GM POWDER 1978

## (undated) DEVICE — GLOVE PROTEXIS POWDER FREE SMT 8.0  2D72PT80X

## (undated) DEVICE — GLOVE PROTEXIS POWDER FREE 8.0 ORTHOPEDIC 2D73ET80

## (undated) DEVICE — SYR 20ML LL W/O NDL

## (undated) DEVICE — DRSG AQUACEL AG 3.5X6.0" HYDROFIBER 412010

## (undated) DEVICE — SU VICRYL 1 MO-4 18" J702D

## (undated) DEVICE — DRAPE MICROSCOPE OPMI ZEISS 48X118" 306071-0000-000

## (undated) DEVICE — NDL 25GA 5/8" 305122

## (undated) DEVICE — SU ETHILON 2-0 PS 18" 585H

## (undated) DEVICE — TUBE CULTURE ANAEROBIC PORT-A-CUL 11ML

## (undated) DEVICE — DRAPE STERI TOWEL LG 1010

## (undated) DEVICE — BLADE KNIFE SURG 11 371111

## (undated) DEVICE — DRSG GAUZE 4X8"

## (undated) DEVICE — DRAIN HEMOVAC RESERVOIR KIT 10FR 1/8" MED 00-2550-002-10

## (undated) DEVICE — SPONGE KITTNER 30-101

## (undated) DEVICE — NDL 22GA 1.5"

## (undated) DEVICE — CATH IV ANGIO INTRO 12GA 382277

## (undated) DEVICE — DRAPE MAYO STAND 23X54 8337

## (undated) DEVICE — GLOVE PROTEXIS BLUE W/NEU-THERA 7.0  2D73EB70

## (undated) DEVICE — LINEN HALF SHEET 5512

## (undated) DEVICE — SU MONOCRYL 4-0 PS-2 27" UND Y426H

## (undated) DEVICE — NDL ANGIOCATH 14GA 1.25" 3068

## (undated) DEVICE — ESU ELEC BLADE 6" COATED E1450-6

## (undated) DEVICE — APPLICATOR COTTON TIP 6"X2 STERILE LF 6012

## (undated) DEVICE — LIGHT HANDLE X2

## (undated) DEVICE — DRSG GAUZE 4X4" TRAY

## (undated) DEVICE — PAD MAGNETIC INST HOLDER 16X10" DISP 200-16

## (undated) DEVICE — DRSG TEGADERM 2 3/8X2 3/4" 1624W

## (undated) DEVICE — DRSG KERLIX 4 1/2"X4YDS ROLL 6730

## (undated) DEVICE — SYR 03ML LL W/O NDL

## (undated) DEVICE — GLOVE PROTEXIS POWDER FREE 6.5 ORTHOPEDIC 2D73ET65

## (undated) DEVICE — LINEN ORTHO ACL PACK 5447

## (undated) DEVICE — DRAPE LAP PEDS DISP 29492

## (undated) DEVICE — ESU ELEC BLADE 2.75" COATED/INSULATED E1455

## (undated) DEVICE — Device

## (undated) DEVICE — DRSG TELFA 3X8" 1238

## (undated) DEVICE — SU VICRYL 0 MO-4 CR 18" J701D

## (undated) DEVICE — SYR 10ML LL W/O NDL

## (undated) DEVICE — ESU PENCIL W/HOLSTER E2350H

## (undated) DEVICE — SU VICRYL 2-0 CT-1 CR 8X27" UND JJ42G

## (undated) DEVICE — PIN DISTRACTION ANCHOR FOR SCR 14MM MDS9091414

## (undated) DEVICE — DRSG STERI STRIP 1/2X4" R1547

## (undated) DEVICE — DRSG AQUACEL AG 3.5X9.75" HYDROFIBER 412011

## (undated) DEVICE — TAPE CLOTH ADHESIVE 3" LATEX FREE

## (undated) DEVICE — SYR 01ML 25GA 5/8"

## (undated) RX ORDER — FENTANYL CITRATE 50 UG/ML
INJECTION, SOLUTION INTRAMUSCULAR; INTRAVENOUS
Status: DISPENSED
Start: 2017-03-03

## (undated) RX ORDER — VASOPRESSIN 20 U/ML
INJECTION PARENTERAL
Status: DISPENSED
Start: 2017-03-03

## (undated) RX ORDER — DEXAMETHASONE SODIUM PHOSPHATE 4 MG/ML
INJECTION, SOLUTION INTRA-ARTICULAR; INTRALESIONAL; INTRAMUSCULAR; INTRAVENOUS; SOFT TISSUE
Status: DISPENSED
Start: 2017-03-03

## (undated) RX ORDER — BUPIVACAINE HYDROCHLORIDE 7.5 MG/ML
INJECTION, SOLUTION EPIDURAL; RETROBULBAR
Status: DISPENSED
Start: 2017-02-09

## (undated) RX ORDER — VANCOMYCIN HYDROCHLORIDE 1 G/200ML
INJECTION, SOLUTION INTRAVENOUS
Status: DISPENSED
Start: 2017-02-09

## (undated) RX ORDER — NEOSTIGMINE METHYLSULFATE 5 MG/5 ML
SYRINGE (ML) INTRAVENOUS
Status: DISPENSED
Start: 2017-02-09

## (undated) RX ORDER — BUPIVACAINE HYDROCHLORIDE 2.5 MG/ML
INJECTION, SOLUTION EPIDURAL; INFILTRATION; INTRACAUDAL
Status: DISPENSED
Start: 2017-02-09

## (undated) RX ORDER — GLYCOPYRROLATE 0.2 MG/ML
INJECTION INTRAMUSCULAR; INTRAVENOUS
Status: DISPENSED
Start: 2017-03-03

## (undated) RX ORDER — CEFAZOLIN SODIUM 1 G/3ML
INJECTION, POWDER, FOR SOLUTION INTRAMUSCULAR; INTRAVENOUS
Status: DISPENSED
Start: 2017-03-03

## (undated) RX ORDER — FENTANYL CITRATE 50 UG/ML
INJECTION, SOLUTION INTRAMUSCULAR; INTRAVENOUS
Status: DISPENSED
Start: 2017-02-09

## (undated) RX ORDER — ONDANSETRON 2 MG/ML
INJECTION INTRAMUSCULAR; INTRAVENOUS
Status: DISPENSED
Start: 2017-03-03

## (undated) RX ORDER — LABETALOL HYDROCHLORIDE 5 MG/ML
INJECTION, SOLUTION INTRAVENOUS
Status: DISPENSED
Start: 2017-02-09

## (undated) RX ORDER — VANCOMYCIN HYDROCHLORIDE 1 G/200ML
INJECTION, SOLUTION INTRAVENOUS
Status: DISPENSED
Start: 2017-03-03

## (undated) RX ORDER — GLYCOPYRROLATE 0.2 MG/ML
INJECTION INTRAMUSCULAR; INTRAVENOUS
Status: DISPENSED
Start: 2017-02-09

## (undated) RX ORDER — DEXAMETHASONE SODIUM PHOSPHATE 4 MG/ML
INJECTION, SOLUTION INTRA-ARTICULAR; INTRALESIONAL; INTRAMUSCULAR; INTRAVENOUS; SOFT TISSUE
Status: DISPENSED
Start: 2017-02-09

## (undated) RX ORDER — ACETAMINOPHEN 325 MG/1
TABLET ORAL
Status: DISPENSED
Start: 2017-02-09

## (undated) RX ORDER — LIDOCAINE HYDROCHLORIDE 10 MG/ML
INJECTION, SOLUTION EPIDURAL; INFILTRATION; INTRACAUDAL; PERINEURAL
Status: DISPENSED
Start: 2017-03-03

## (undated) RX ORDER — ONDANSETRON 2 MG/ML
INJECTION INTRAMUSCULAR; INTRAVENOUS
Status: DISPENSED
Start: 2017-02-09

## (undated) RX ORDER — GINSENG 100 MG
CAPSULE ORAL
Status: DISPENSED
Start: 2017-03-03

## (undated) RX ORDER — LIDOCAINE HYDROCHLORIDE AND EPINEPHRINE 10; 10 MG/ML; UG/ML
INJECTION, SOLUTION INFILTRATION; PERINEURAL
Status: DISPENSED
Start: 2017-02-09

## (undated) RX ORDER — PROPOFOL 10 MG/ML
INJECTION, EMULSION INTRAVENOUS
Status: DISPENSED
Start: 2017-02-09

## (undated) RX ORDER — GINSENG 100 MG
CAPSULE ORAL
Status: DISPENSED
Start: 2017-02-09

## (undated) RX ORDER — PROPOFOL 10 MG/ML
INJECTION, EMULSION INTRAVENOUS
Status: DISPENSED
Start: 2017-03-03

## (undated) RX ORDER — LIDOCAINE HYDROCHLORIDE 10 MG/ML
INJECTION, SOLUTION EPIDURAL; INFILTRATION; INTRACAUDAL; PERINEURAL
Status: DISPENSED
Start: 2017-02-09

## (undated) RX ORDER — CEFAZOLIN SODIUM 2 G/100ML
INJECTION, SOLUTION INTRAVENOUS
Status: DISPENSED
Start: 2017-02-09

## (undated) RX ORDER — NEOSTIGMINE METHYLSULFATE 5 MG/5 ML
SYRINGE (ML) INTRAVENOUS
Status: DISPENSED
Start: 2017-03-03